# Patient Record
Sex: MALE | Race: WHITE | Employment: FULL TIME | URBAN - NONMETROPOLITAN AREA
[De-identification: names, ages, dates, MRNs, and addresses within clinical notes are randomized per-mention and may not be internally consistent; named-entity substitution may affect disease eponyms.]

---

## 2017-01-04 DIAGNOSIS — E78.00 HYPERCHOLESTEROLEMIA: ICD-10-CM

## 2017-01-04 DIAGNOSIS — I10 ESSENTIAL HYPERTENSION: ICD-10-CM

## 2017-01-04 LAB
ALBUMIN SERPL-MCNC: 4.7 G/DL (ref 3.9–4.9)
ALP BLD-CCNC: 65 U/L (ref 35–104)
ALT SERPL-CCNC: 23 U/L (ref 0–41)
ANION GAP SERPL CALCULATED.3IONS-SCNC: 13 MEQ/L (ref 7–13)
AST SERPL-CCNC: 17 U/L (ref 0–40)
BILIRUB SERPL-MCNC: 0.6 MG/DL (ref 0–1.2)
BUN BLDV-MCNC: 15 MG/DL (ref 6–20)
CALCIUM SERPL-MCNC: 9.2 MG/DL (ref 8.6–10.2)
CHLORIDE BLD-SCNC: 101 MEQ/L (ref 98–107)
CHOLESTEROL, TOTAL: 136 MG/DL (ref 0–199)
CO2: 26 MEQ/L (ref 22–29)
CREAT SERPL-MCNC: 0.85 MG/DL (ref 0.7–1.2)
GFR AFRICAN AMERICAN: >60
GFR NON-AFRICAN AMERICAN: >60
GLOBULIN: 2.4 G/DL (ref 2.3–3.5)
GLUCOSE BLD-MCNC: 98 MG/DL (ref 74–109)
HDLC SERPL-MCNC: 42 MG/DL (ref 40–59)
LDL CHOLESTEROL CALCULATED: 74 MG/DL (ref 0–129)
POTASSIUM SERPL-SCNC: 4.1 MEQ/L (ref 3.5–5.1)
SODIUM BLD-SCNC: 140 MEQ/L (ref 132–144)
TOTAL PROTEIN: 7.1 G/DL (ref 6.4–8.1)
TRIGL SERPL-MCNC: 102 MG/DL (ref 0–200)

## 2017-05-15 ENCOUNTER — OFFICE VISIT (OUTPATIENT)
Dept: FAMILY MEDICINE CLINIC | Age: 52
End: 2017-05-15

## 2017-05-15 VITALS
BODY MASS INDEX: 29.47 KG/M2 | SYSTOLIC BLOOD PRESSURE: 112 MMHG | HEIGHT: 76 IN | OXYGEN SATURATION: 98 % | DIASTOLIC BLOOD PRESSURE: 72 MMHG | HEART RATE: 72 BPM | WEIGHT: 242 LBS | TEMPERATURE: 95.5 F

## 2017-05-15 DIAGNOSIS — I10 ESSENTIAL HYPERTENSION: ICD-10-CM

## 2017-05-15 DIAGNOSIS — E78.00 HYPERCHOLESTEROLEMIA: ICD-10-CM

## 2017-05-15 DIAGNOSIS — Z12.5 SCREENING PSA (PROSTATE SPECIFIC ANTIGEN): ICD-10-CM

## 2017-05-15 DIAGNOSIS — I10 ESSENTIAL HYPERTENSION: Primary | ICD-10-CM

## 2017-05-15 LAB
ALBUMIN SERPL-MCNC: 4.4 G/DL (ref 3.9–4.9)
ALP BLD-CCNC: 61 U/L (ref 35–104)
ALT SERPL-CCNC: 23 U/L (ref 0–41)
ANION GAP SERPL CALCULATED.3IONS-SCNC: 12 MEQ/L (ref 7–13)
AST SERPL-CCNC: 17 U/L (ref 0–40)
BILIRUB SERPL-MCNC: 0.6 MG/DL (ref 0–1.2)
BUN BLDV-MCNC: 18 MG/DL (ref 6–20)
CALCIUM SERPL-MCNC: 9.4 MG/DL (ref 8.6–10.2)
CHLORIDE BLD-SCNC: 101 MEQ/L (ref 98–107)
CO2: 26 MEQ/L (ref 22–29)
CREAT SERPL-MCNC: 0.7 MG/DL (ref 0.7–1.2)
GFR AFRICAN AMERICAN: >60
GFR NON-AFRICAN AMERICAN: >60
GLOBULIN: 2.6 G/DL (ref 2.3–3.5)
GLUCOSE BLD-MCNC: 112 MG/DL (ref 74–109)
POTASSIUM SERPL-SCNC: 4.1 MEQ/L (ref 3.5–5.1)
PROSTATE SPECIFIC ANTIGEN: 1.14 NG/ML (ref 0–3.89)
SODIUM BLD-SCNC: 139 MEQ/L (ref 132–144)
TOTAL PROTEIN: 7 G/DL (ref 6.4–8.1)

## 2017-05-15 PROCEDURE — G8419 CALC BMI OUT NRM PARAM NOF/U: HCPCS | Performed by: NURSE PRACTITIONER

## 2017-05-15 PROCEDURE — G8427 DOCREV CUR MEDS BY ELIG CLIN: HCPCS | Performed by: NURSE PRACTITIONER

## 2017-05-15 PROCEDURE — 99213 OFFICE O/P EST LOW 20 MIN: CPT | Performed by: NURSE PRACTITIONER

## 2017-05-15 PROCEDURE — 1036F TOBACCO NON-USER: CPT | Performed by: NURSE PRACTITIONER

## 2017-05-15 RX ORDER — AMLODIPINE BESYLATE 5 MG/1
TABLET ORAL
Qty: 180 TABLET | Refills: 1 | Status: SHIPPED | OUTPATIENT
Start: 2017-05-15 | End: 2017-11-15 | Stop reason: SDUPTHER

## 2017-05-15 RX ORDER — ROSUVASTATIN CALCIUM 5 MG/1
TABLET, COATED ORAL
Qty: 90 TABLET | Refills: 1 | Status: SHIPPED | OUTPATIENT
Start: 2017-05-15 | End: 2017-11-15 | Stop reason: SDUPTHER

## 2017-05-15 RX ORDER — VALSARTAN AND HYDROCHLOROTHIAZIDE 320; 12.5 MG/1; MG/1
TABLET, FILM COATED ORAL
Qty: 90 TABLET | Refills: 1 | Status: SHIPPED | OUTPATIENT
Start: 2017-05-15 | End: 2017-11-15 | Stop reason: SDUPTHER

## 2017-05-15 ASSESSMENT — ENCOUNTER SYMPTOMS
SHORTNESS OF BREATH: 0
CONSTIPATION: 0
COUGH: 0
DIARRHEA: 0

## 2017-11-15 ENCOUNTER — OFFICE VISIT (OUTPATIENT)
Dept: FAMILY MEDICINE CLINIC | Age: 52
End: 2017-11-15

## 2017-11-15 VITALS
OXYGEN SATURATION: 98 % | WEIGHT: 237 LBS | HEIGHT: 76 IN | DIASTOLIC BLOOD PRESSURE: 60 MMHG | HEART RATE: 78 BPM | BODY MASS INDEX: 28.86 KG/M2 | SYSTOLIC BLOOD PRESSURE: 130 MMHG | TEMPERATURE: 96.5 F

## 2017-11-15 DIAGNOSIS — Z11.4 ENCOUNTER FOR SCREENING FOR HIV: ICD-10-CM

## 2017-11-15 DIAGNOSIS — Z23 NEED FOR INFLUENZA VACCINATION: ICD-10-CM

## 2017-11-15 DIAGNOSIS — I10 ESSENTIAL HYPERTENSION: Primary | ICD-10-CM

## 2017-11-15 DIAGNOSIS — E78.00 HYPERCHOLESTEROLEMIA: ICD-10-CM

## 2017-11-15 PROCEDURE — 99213 OFFICE O/P EST LOW 20 MIN: CPT | Performed by: NURSE PRACTITIONER

## 2017-11-15 PROCEDURE — G8427 DOCREV CUR MEDS BY ELIG CLIN: HCPCS | Performed by: NURSE PRACTITIONER

## 2017-11-15 PROCEDURE — 90471 IMMUNIZATION ADMIN: CPT | Performed by: NURSE PRACTITIONER

## 2017-11-15 PROCEDURE — G8484 FLU IMMUNIZE NO ADMIN: HCPCS | Performed by: NURSE PRACTITIONER

## 2017-11-15 PROCEDURE — 3017F COLORECTAL CA SCREEN DOC REV: CPT | Performed by: NURSE PRACTITIONER

## 2017-11-15 PROCEDURE — 90688 IIV4 VACCINE SPLT 0.5 ML IM: CPT | Performed by: NURSE PRACTITIONER

## 2017-11-15 PROCEDURE — 1036F TOBACCO NON-USER: CPT | Performed by: NURSE PRACTITIONER

## 2017-11-15 PROCEDURE — G8417 CALC BMI ABV UP PARAM F/U: HCPCS | Performed by: NURSE PRACTITIONER

## 2017-11-15 RX ORDER — ROSUVASTATIN CALCIUM 5 MG/1
TABLET, COATED ORAL
Qty: 90 TABLET | Refills: 1 | Status: SHIPPED | OUTPATIENT
Start: 2017-11-15 | End: 2018-05-15 | Stop reason: SDUPTHER

## 2017-11-15 RX ORDER — AMLODIPINE BESYLATE 5 MG/1
TABLET ORAL
Qty: 180 TABLET | Refills: 1 | Status: SHIPPED | OUTPATIENT
Start: 2017-11-15 | End: 2018-05-15 | Stop reason: SDUPTHER

## 2017-11-15 RX ORDER — VALSARTAN AND HYDROCHLOROTHIAZIDE 320; 12.5 MG/1; MG/1
TABLET, FILM COATED ORAL
Qty: 90 TABLET | Refills: 1 | Status: SHIPPED | OUTPATIENT
Start: 2017-11-15 | End: 2018-05-15 | Stop reason: SDUPTHER

## 2017-11-15 ASSESSMENT — PATIENT HEALTH QUESTIONNAIRE - PHQ9
SUM OF ALL RESPONSES TO PHQ9 QUESTIONS 1 & 2: 0
1. LITTLE INTEREST OR PLEASURE IN DOING THINGS: 0
SUM OF ALL RESPONSES TO PHQ QUESTIONS 1-9: 0
2. FEELING DOWN, DEPRESSED OR HOPELESS: 0

## 2017-11-15 ASSESSMENT — ENCOUNTER SYMPTOMS
COUGH: 0
SHORTNESS OF BREATH: 0

## 2017-11-15 NOTE — PROGRESS NOTES
Vaccine Information Sheet, \"Influenza - Inactivated\"  given to John Valdez, or parent/legal guardian of  John Valdez and verbalized understanding. Patient responses:    Have you ever had a reaction to a flu vaccine? No  Are you able to eat eggs without adverse effects? Yes  Do you have any current illness? No  Have you ever had Guillian Reinbeck Syndrome? No    Flu vaccine given per order. Please see immunization tab. After obtaining consent, and per orders of Dr. Analy Barrios injection of flu given in Left deltoid by Cayla Collier. Patient instructed to remain in clinic for 20 minutes afterwards, and to report any adverse reaction to me immediately.
Expiration Date:   11/15/2018     Order Specific Question:   Is Patient Fasting?/# of Hours     Answer:   12    Comprehensive Metabolic Panel     Standing Status:   Future     Standing Expiration Date:   11/15/2018    Hiv-1,-2 W/Reflex To Hiv-1 Western Blot     Standing Status:   Future     Standing Expiration Date:   11/15/2018       Orders Placed This Encounter   Medications    valsartan-hydrochlorothiazide (DIOVAN-HCT) 320-12.5 MG per tablet     Sig: TAKE 1 TABLET DAILY     Dispense:  90 tablet     Refill:  1    rosuvastatin (CRESTOR) 5 MG tablet     Sig: TAKE 1 TABLET DAILY     Dispense:  90 tablet     Refill:  1    amLODIPine (NORVASC) 5 MG tablet     Sig: TAKE 1 TABLET TWICE A DAY     Dispense:  180 tablet     Refill:  1       Side effects, adverse effects of the medication prescribed today, as well as treatment plan/ rationale and result expectations have been discussed with the patient who expresses understanding and desires to proceed. Close follow up to evaluate treatment results and for coordination of care. I have reviewed the patient's medical history in detail and updated the computerized patient record. As always, patient is advised that if symptoms worsen in any way they will proceed to the nearest emergency room. Fu prn or in 6 mos.   Michael Dawson, NP

## 2017-12-04 DIAGNOSIS — I10 ESSENTIAL HYPERTENSION: ICD-10-CM

## 2017-12-04 DIAGNOSIS — Z11.4 ENCOUNTER FOR SCREENING FOR HIV: ICD-10-CM

## 2017-12-04 DIAGNOSIS — E78.00 HYPERCHOLESTEROLEMIA: ICD-10-CM

## 2017-12-04 LAB
ALBUMIN SERPL-MCNC: 4.7 G/DL (ref 3.9–4.9)
ALP BLD-CCNC: 64 U/L (ref 35–104)
ALT SERPL-CCNC: 21 U/L (ref 0–41)
ANION GAP SERPL CALCULATED.3IONS-SCNC: 15 MEQ/L (ref 7–13)
AST SERPL-CCNC: 16 U/L (ref 0–40)
BILIRUB SERPL-MCNC: 0.5 MG/DL (ref 0–1.2)
BUN BLDV-MCNC: 17 MG/DL (ref 6–20)
CALCIUM SERPL-MCNC: 9.2 MG/DL (ref 8.6–10.2)
CHLORIDE BLD-SCNC: 99 MEQ/L (ref 98–107)
CHOLESTEROL, TOTAL: 155 MG/DL (ref 0–199)
CO2: 26 MEQ/L (ref 22–29)
CREAT SERPL-MCNC: 0.84 MG/DL (ref 0.7–1.2)
GFR AFRICAN AMERICAN: >60
GFR NON-AFRICAN AMERICAN: >60
GLOBULIN: 2.4 G/DL (ref 2.3–3.5)
GLUCOSE BLD-MCNC: 99 MG/DL (ref 74–109)
HDLC SERPL-MCNC: 53 MG/DL (ref 40–59)
LDL CHOLESTEROL CALCULATED: 84 MG/DL (ref 0–129)
POTASSIUM SERPL-SCNC: 4.1 MEQ/L (ref 3.5–5.1)
SODIUM BLD-SCNC: 140 MEQ/L (ref 132–144)
TOTAL PROTEIN: 7.1 G/DL (ref 6.4–8.1)
TRIGL SERPL-MCNC: 92 MG/DL (ref 0–200)

## 2017-12-06 LAB — HIV-1 AND HIV-2 ANTIBODIES: NEGATIVE

## 2018-05-15 ENCOUNTER — OFFICE VISIT (OUTPATIENT)
Dept: FAMILY MEDICINE CLINIC | Age: 53
End: 2018-05-15
Payer: COMMERCIAL

## 2018-05-15 VITALS
BODY MASS INDEX: 27.89 KG/M2 | HEIGHT: 76 IN | OXYGEN SATURATION: 98 % | TEMPERATURE: 96.1 F | DIASTOLIC BLOOD PRESSURE: 62 MMHG | HEART RATE: 62 BPM | SYSTOLIC BLOOD PRESSURE: 124 MMHG | WEIGHT: 229 LBS

## 2018-05-15 DIAGNOSIS — E78.00 HYPERCHOLESTEROLEMIA: Primary | ICD-10-CM

## 2018-05-15 DIAGNOSIS — Z13.1 DIABETES MELLITUS SCREENING: ICD-10-CM

## 2018-05-15 DIAGNOSIS — I10 ESSENTIAL HYPERTENSION: ICD-10-CM

## 2018-05-15 PROCEDURE — 99213 OFFICE O/P EST LOW 20 MIN: CPT | Performed by: NURSE PRACTITIONER

## 2018-05-15 RX ORDER — ROSUVASTATIN CALCIUM 5 MG/1
TABLET, COATED ORAL
Qty: 90 TABLET | Refills: 1 | Status: SHIPPED | OUTPATIENT
Start: 2018-05-15 | End: 2018-07-09 | Stop reason: SDUPTHER

## 2018-05-15 RX ORDER — AMLODIPINE BESYLATE 5 MG/1
TABLET ORAL
Qty: 180 TABLET | Refills: 1 | Status: SHIPPED | OUTPATIENT
Start: 2018-05-15 | End: 2018-07-09 | Stop reason: SDUPTHER

## 2018-05-15 RX ORDER — VALSARTAN AND HYDROCHLOROTHIAZIDE 320; 12.5 MG/1; MG/1
TABLET, FILM COATED ORAL
Qty: 90 TABLET | Refills: 1 | Status: SHIPPED | OUTPATIENT
Start: 2018-05-15 | End: 2018-07-09 | Stop reason: SDUPTHER

## 2018-05-15 ASSESSMENT — ENCOUNTER SYMPTOMS
COUGH: 0
SHORTNESS OF BREATH: 0

## 2018-06-22 DIAGNOSIS — Z13.1 DIABETES MELLITUS SCREENING: ICD-10-CM

## 2018-06-22 LAB
ALBUMIN SERPL-MCNC: 4.5 G/DL (ref 3.9–4.9)
ALP BLD-CCNC: 66 U/L (ref 35–104)
ALT SERPL-CCNC: 17 U/L (ref 0–41)
ANION GAP SERPL CALCULATED.3IONS-SCNC: 14 MEQ/L (ref 7–13)
AST SERPL-CCNC: 13 U/L (ref 0–40)
BILIRUB SERPL-MCNC: 0.6 MG/DL (ref 0–1.2)
BILIRUBIN DIRECT: <0.2 MG/DL (ref 0–0.3)
BILIRUBIN, INDIRECT: NORMAL MG/DL (ref 0–0.6)
BUN BLDV-MCNC: 16 MG/DL (ref 6–20)
CALCIUM SERPL-MCNC: 9.4 MG/DL (ref 8.6–10.2)
CHLORIDE BLD-SCNC: 102 MEQ/L (ref 98–107)
CHOLESTEROL, TOTAL: 143 MG/DL (ref 0–199)
CO2: 25 MEQ/L (ref 22–29)
CREAT SERPL-MCNC: 0.84 MG/DL (ref 0.7–1.2)
GFR AFRICAN AMERICAN: >60
GFR NON-AFRICAN AMERICAN: >60
GLUCOSE BLD-MCNC: 86 MG/DL (ref 74–109)
HBA1C MFR BLD: 5.6 % (ref 4.8–5.9)
HDLC SERPL-MCNC: 48 MG/DL (ref 40–59)
LDL CHOLESTEROL CALCULATED: 82 MG/DL (ref 0–129)
POTASSIUM SERPL-SCNC: 3.7 MEQ/L (ref 3.5–5.1)
SODIUM BLD-SCNC: 141 MEQ/L (ref 132–144)
TOTAL PROTEIN: 7.3 G/DL (ref 6.4–8.1)
TRIGL SERPL-MCNC: 67 MG/DL (ref 0–200)

## 2018-07-09 DIAGNOSIS — I10 ESSENTIAL HYPERTENSION: ICD-10-CM

## 2018-07-09 DIAGNOSIS — E78.00 HYPERCHOLESTEROLEMIA: ICD-10-CM

## 2018-07-09 RX ORDER — ROSUVASTATIN CALCIUM 5 MG/1
TABLET, COATED ORAL
Qty: 90 TABLET | Refills: 1 | Status: SHIPPED | OUTPATIENT
Start: 2018-07-09 | End: 2018-10-23 | Stop reason: SDUPTHER

## 2018-07-09 RX ORDER — AMLODIPINE BESYLATE 5 MG/1
TABLET ORAL
Qty: 180 TABLET | Refills: 1 | Status: SHIPPED | OUTPATIENT
Start: 2018-07-09 | End: 2018-10-23 | Stop reason: SDUPTHER

## 2018-07-09 RX ORDER — VALSARTAN AND HYDROCHLOROTHIAZIDE 320; 12.5 MG/1; MG/1
TABLET, FILM COATED ORAL
Qty: 90 TABLET | Refills: 1 | Status: SHIPPED | OUTPATIENT
Start: 2018-07-09 | End: 2018-11-15 | Stop reason: SINTOL

## 2018-08-02 DIAGNOSIS — I10 ESSENTIAL HYPERTENSION: Primary | ICD-10-CM

## 2018-08-06 RX ORDER — OLMESARTAN MEDOXOMIL AND HYDROCHLOROTHIAZIDE 40/12.5 40; 12.5 MG/1; MG/1
1 TABLET ORAL DAILY
Qty: 90 TABLET | Refills: 1 | Status: SHIPPED | OUTPATIENT
Start: 2018-08-06 | End: 2018-10-23 | Stop reason: SDUPTHER

## 2018-08-06 RX ORDER — OLMESARTAN MEDOXOMIL AND HYDROCHLOROTHIAZIDE 40/12.5 40; 12.5 MG/1; MG/1
1 TABLET ORAL DAILY
Qty: 30 TABLET | Refills: 3 | Status: SHIPPED | OUTPATIENT
Start: 2018-08-06 | End: 2018-08-06 | Stop reason: SDUPTHER

## 2018-10-23 DIAGNOSIS — I10 ESSENTIAL HYPERTENSION: ICD-10-CM

## 2018-10-23 DIAGNOSIS — E78.00 HYPERCHOLESTEROLEMIA: ICD-10-CM

## 2018-10-23 RX ORDER — ROSUVASTATIN CALCIUM 5 MG/1
TABLET, COATED ORAL
Qty: 90 TABLET | Refills: 1 | Status: SHIPPED | OUTPATIENT
Start: 2018-10-23 | End: 2019-05-06 | Stop reason: SDUPTHER

## 2018-10-23 RX ORDER — AMLODIPINE BESYLATE 5 MG/1
TABLET ORAL
Qty: 180 TABLET | Refills: 1 | Status: SHIPPED | OUTPATIENT
Start: 2018-10-23 | End: 2019-05-06 | Stop reason: SDUPTHER

## 2018-10-23 RX ORDER — OLMESARTAN MEDOXOMIL AND HYDROCHLOROTHIAZIDE 40/12.5 40; 12.5 MG/1; MG/1
1 TABLET ORAL DAILY
Qty: 90 TABLET | Refills: 1 | Status: SHIPPED | OUTPATIENT
Start: 2018-10-23 | End: 2019-05-06 | Stop reason: SDUPTHER

## 2018-10-23 NOTE — TELEPHONE ENCOUNTER
From: Daryle Heidelberg  Sent: 10/23/2018 9:10 AM EDT  Subject: Medication Renewal Request    Tai Banegas would like a refill of the following medications:     rosuvastatin (CRESTOR) 5 MG tablet [facility] APRGAVIN - CNP]     amLODIPine (NORVASC) 5 MG tablet [facility] APRGAVIN - CNP]     olmesartan-hydrochlorothiazide (BENICAR HCT) 40-12.5 MG per tablet [facility] APRGAVIN - CNP]    Preferred pharmacy: [facility], [address] [place] [address] [phone] [facility] [phone]

## 2018-11-15 ENCOUNTER — OFFICE VISIT (OUTPATIENT)
Dept: FAMILY MEDICINE CLINIC | Age: 53
End: 2018-11-15
Payer: COMMERCIAL

## 2018-11-15 VITALS
SYSTOLIC BLOOD PRESSURE: 130 MMHG | HEART RATE: 75 BPM | DIASTOLIC BLOOD PRESSURE: 68 MMHG | OXYGEN SATURATION: 99 % | HEIGHT: 76 IN | TEMPERATURE: 96.6 F | WEIGHT: 222.8 LBS | BODY MASS INDEX: 27.13 KG/M2

## 2018-11-15 DIAGNOSIS — I10 ESSENTIAL HYPERTENSION: Primary | ICD-10-CM

## 2018-11-15 DIAGNOSIS — E78.00 HYPERCHOLESTEROLEMIA: ICD-10-CM

## 2018-11-15 DIAGNOSIS — Z23 NEED FOR INFLUENZA VACCINATION: ICD-10-CM

## 2018-11-15 DIAGNOSIS — K90.0 CELIAC DISEASE: ICD-10-CM

## 2018-11-15 PROCEDURE — 3017F COLORECTAL CA SCREEN DOC REV: CPT | Performed by: NURSE PRACTITIONER

## 2018-11-15 PROCEDURE — 1036F TOBACCO NON-USER: CPT | Performed by: NURSE PRACTITIONER

## 2018-11-15 PROCEDURE — G8482 FLU IMMUNIZE ORDER/ADMIN: HCPCS | Performed by: NURSE PRACTITIONER

## 2018-11-15 PROCEDURE — G8427 DOCREV CUR MEDS BY ELIG CLIN: HCPCS | Performed by: NURSE PRACTITIONER

## 2018-11-15 PROCEDURE — 99214 OFFICE O/P EST MOD 30 MIN: CPT | Performed by: NURSE PRACTITIONER

## 2018-11-15 PROCEDURE — 90688 IIV4 VACCINE SPLT 0.5 ML IM: CPT | Performed by: NURSE PRACTITIONER

## 2018-11-15 PROCEDURE — G8417 CALC BMI ABV UP PARAM F/U: HCPCS | Performed by: NURSE PRACTITIONER

## 2018-11-15 PROCEDURE — 90471 IMMUNIZATION ADMIN: CPT | Performed by: NURSE PRACTITIONER

## 2018-11-15 ASSESSMENT — PATIENT HEALTH QUESTIONNAIRE - PHQ9
SUM OF ALL RESPONSES TO PHQ9 QUESTIONS 1 & 2: 0
2. FEELING DOWN, DEPRESSED OR HOPELESS: 0
1. LITTLE INTEREST OR PLEASURE IN DOING THINGS: 0
SUM OF ALL RESPONSES TO PHQ QUESTIONS 1-9: 0
SUM OF ALL RESPONSES TO PHQ QUESTIONS 1-9: 0

## 2018-11-15 ASSESSMENT — ENCOUNTER SYMPTOMS
DIARRHEA: 0
SHORTNESS OF BREATH: 0
CONSTIPATION: 0
COUGH: 0

## 2018-11-15 NOTE — PROGRESS NOTES
Vaccine Information Sheet, \"Influenza - Inactivated\"  given to Charli Richardson, or parent/legal guardian of  Charli Richardson and verbalized understanding. Patient responses:    Have you ever had a reaction to a flu vaccine? No  Are you able to eat eggs without adverse effects? Yes  Do you have any current illness? No  Have you ever had Guillian Holt Syndrome? No    Flu vaccine given per order. Please see immunization tab. After obtaining consent, and per orders of eileen luevano cnp, injection of flu shot given in Left deltoid by Yvonne Zamorano. Patient instructed to remain in clinic for 20 minutes afterwards, and to report any adverse reaction to me immediately. Pt tolerated well.

## 2019-05-06 DIAGNOSIS — E78.00 HYPERCHOLESTEROLEMIA: ICD-10-CM

## 2019-05-06 DIAGNOSIS — I10 ESSENTIAL HYPERTENSION: ICD-10-CM

## 2019-05-06 RX ORDER — ROSUVASTATIN CALCIUM 5 MG/1
TABLET, COATED ORAL
Qty: 90 TABLET | Refills: 1 | Status: SHIPPED | OUTPATIENT
Start: 2019-05-06 | End: 2019-11-05 | Stop reason: SDUPTHER

## 2019-05-06 RX ORDER — OLMESARTAN MEDOXOMIL AND HYDROCHLOROTHIAZIDE 40/12.5 40; 12.5 MG/1; MG/1
1 TABLET ORAL DAILY
Qty: 90 TABLET | Refills: 1 | Status: SHIPPED | OUTPATIENT
Start: 2019-05-06 | End: 2019-08-12

## 2019-05-06 RX ORDER — AMLODIPINE BESYLATE 5 MG/1
TABLET ORAL
Qty: 180 TABLET | Refills: 1 | Status: SHIPPED | OUTPATIENT
Start: 2019-05-06 | End: 2019-11-05 | Stop reason: SDUPTHER

## 2019-08-11 DIAGNOSIS — I10 ESSENTIAL HYPERTENSION: ICD-10-CM

## 2019-08-12 RX ORDER — OLMESARTAN MEDOXOMIL AND HYDROCHLOROTHIAZIDE 40/12.5 40; 12.5 MG/1; MG/1
TABLET ORAL
Qty: 90 TABLET | Refills: 0 | Status: SHIPPED | OUTPATIENT
Start: 2019-08-12 | End: 2019-11-05 | Stop reason: SDUPTHER

## 2019-11-05 ENCOUNTER — OFFICE VISIT (OUTPATIENT)
Dept: FAMILY MEDICINE CLINIC | Age: 54
End: 2019-11-05
Payer: COMMERCIAL

## 2019-11-05 VITALS
HEART RATE: 66 BPM | SYSTOLIC BLOOD PRESSURE: 110 MMHG | OXYGEN SATURATION: 96 % | DIASTOLIC BLOOD PRESSURE: 60 MMHG | TEMPERATURE: 98.8 F | WEIGHT: 230.6 LBS | BODY MASS INDEX: 28.08 KG/M2 | HEIGHT: 76 IN | RESPIRATION RATE: 12 BRPM

## 2019-11-05 DIAGNOSIS — R68.89 COLD INTOLERANCE: ICD-10-CM

## 2019-11-05 DIAGNOSIS — I10 ESSENTIAL HYPERTENSION: Primary | ICD-10-CM

## 2019-11-05 DIAGNOSIS — I10 ESSENTIAL HYPERTENSION: ICD-10-CM

## 2019-11-05 DIAGNOSIS — E78.00 HYPERCHOLESTEROLEMIA: ICD-10-CM

## 2019-11-05 DIAGNOSIS — Z23 NEED FOR INFLUENZA VACCINATION: ICD-10-CM

## 2019-11-05 LAB
ALBUMIN SERPL-MCNC: 4.7 G/DL (ref 3.5–4.6)
ALP BLD-CCNC: 65 U/L (ref 35–104)
ALT SERPL-CCNC: 18 U/L (ref 0–41)
ANION GAP SERPL CALCULATED.3IONS-SCNC: 12 MEQ/L (ref 9–15)
AST SERPL-CCNC: 17 U/L (ref 0–40)
BASOPHILS ABSOLUTE: 0.1 K/UL (ref 0–0.2)
BASOPHILS RELATIVE PERCENT: 1 %
BILIRUB SERPL-MCNC: 0.6 MG/DL (ref 0.2–0.7)
BUN BLDV-MCNC: 20 MG/DL (ref 6–20)
CALCIUM SERPL-MCNC: 9.2 MG/DL (ref 8.5–9.9)
CHLORIDE BLD-SCNC: 101 MEQ/L (ref 95–107)
CHOLESTEROL, TOTAL: 169 MG/DL (ref 0–199)
CO2: 27 MEQ/L (ref 20–31)
CREAT SERPL-MCNC: 0.86 MG/DL (ref 0.7–1.2)
EOSINOPHILS ABSOLUTE: 0.4 K/UL (ref 0–0.7)
EOSINOPHILS RELATIVE PERCENT: 6.9 %
GFR AFRICAN AMERICAN: >60
GFR NON-AFRICAN AMERICAN: >60
GLOBULIN: 2.5 G/DL (ref 2.3–3.5)
GLUCOSE BLD-MCNC: 71 MG/DL (ref 70–99)
HCT VFR BLD CALC: 43.3 % (ref 42–52)
HDLC SERPL-MCNC: 49 MG/DL (ref 40–59)
HEMOGLOBIN: 14.8 G/DL (ref 14–18)
LDL CHOLESTEROL CALCULATED: 97 MG/DL (ref 0–129)
LYMPHOCYTES ABSOLUTE: 1.8 K/UL (ref 1–4.8)
LYMPHOCYTES RELATIVE PERCENT: 33.5 %
MCH RBC QN AUTO: 31.2 PG (ref 27–31.3)
MCHC RBC AUTO-ENTMCNC: 34.1 % (ref 33–37)
MCV RBC AUTO: 91.6 FL (ref 80–100)
MONOCYTES ABSOLUTE: 0.5 K/UL (ref 0.2–0.8)
MONOCYTES RELATIVE PERCENT: 9.9 %
NEUTROPHILS ABSOLUTE: 2.7 K/UL (ref 1.4–6.5)
NEUTROPHILS RELATIVE PERCENT: 48.7 %
PDW BLD-RTO: 13.2 % (ref 11.5–14.5)
PLATELET # BLD: 277 K/UL (ref 130–400)
POTASSIUM SERPL-SCNC: 3.9 MEQ/L (ref 3.4–4.9)
RBC # BLD: 4.73 M/UL (ref 4.7–6.1)
SODIUM BLD-SCNC: 140 MEQ/L (ref 135–144)
TOTAL PROTEIN: 7.2 G/DL (ref 6.3–8)
TRIGL SERPL-MCNC: 115 MG/DL (ref 0–150)
TSH REFLEX: 1.48 UIU/ML (ref 0.44–3.86)
WBC # BLD: 5.5 K/UL (ref 4.8–10.8)

## 2019-11-05 PROCEDURE — 90471 IMMUNIZATION ADMIN: CPT | Performed by: NURSE PRACTITIONER

## 2019-11-05 PROCEDURE — 90686 IIV4 VACC NO PRSV 0.5 ML IM: CPT | Performed by: NURSE PRACTITIONER

## 2019-11-05 PROCEDURE — G8482 FLU IMMUNIZE ORDER/ADMIN: HCPCS | Performed by: NURSE PRACTITIONER

## 2019-11-05 PROCEDURE — 3017F COLORECTAL CA SCREEN DOC REV: CPT | Performed by: NURSE PRACTITIONER

## 2019-11-05 PROCEDURE — G8417 CALC BMI ABV UP PARAM F/U: HCPCS | Performed by: NURSE PRACTITIONER

## 2019-11-05 PROCEDURE — 99214 OFFICE O/P EST MOD 30 MIN: CPT | Performed by: NURSE PRACTITIONER

## 2019-11-05 PROCEDURE — 1036F TOBACCO NON-USER: CPT | Performed by: NURSE PRACTITIONER

## 2019-11-05 PROCEDURE — G8427 DOCREV CUR MEDS BY ELIG CLIN: HCPCS | Performed by: NURSE PRACTITIONER

## 2019-11-05 RX ORDER — OLMESARTAN MEDOXOMIL AND HYDROCHLOROTHIAZIDE 40/12.5 40; 12.5 MG/1; MG/1
TABLET ORAL
Qty: 90 TABLET | Refills: 1 | Status: SHIPPED | OUTPATIENT
Start: 2019-11-05 | End: 2020-04-01 | Stop reason: SDUPTHER

## 2019-11-05 RX ORDER — ROSUVASTATIN CALCIUM 5 MG/1
TABLET, COATED ORAL
Qty: 90 TABLET | Refills: 1 | Status: SHIPPED | OUTPATIENT
Start: 2019-11-05 | End: 2020-04-01 | Stop reason: SDUPTHER

## 2019-11-05 RX ORDER — AMLODIPINE BESYLATE 5 MG/1
TABLET ORAL
Qty: 180 TABLET | Refills: 1 | Status: SHIPPED | OUTPATIENT
Start: 2019-11-05 | End: 2020-04-01 | Stop reason: SDUPTHER

## 2019-11-05 ASSESSMENT — ENCOUNTER SYMPTOMS
DIARRHEA: 0
CONSTIPATION: 0
BLURRED VISION: 0
SHORTNESS OF BREATH: 0
COUGH: 0

## 2019-11-05 ASSESSMENT — PATIENT HEALTH QUESTIONNAIRE - PHQ9
SUM OF ALL RESPONSES TO PHQ9 QUESTIONS 1 & 2: 0
2. FEELING DOWN, DEPRESSED OR HOPELESS: 0
SUM OF ALL RESPONSES TO PHQ QUESTIONS 1-9: 0
SUM OF ALL RESPONSES TO PHQ QUESTIONS 1-9: 0
1. LITTLE INTEREST OR PLEASURE IN DOING THINGS: 0

## 2020-04-01 RX ORDER — OLMESARTAN MEDOXOMIL AND HYDROCHLOROTHIAZIDE 40/12.5 40; 12.5 MG/1; MG/1
TABLET ORAL
Qty: 90 TABLET | Refills: 1 | Status: SHIPPED | OUTPATIENT
Start: 2020-04-01 | End: 2020-11-09 | Stop reason: SDUPTHER

## 2020-04-01 RX ORDER — AMLODIPINE BESYLATE 5 MG/1
TABLET ORAL
Qty: 180 TABLET | Refills: 1 | Status: SHIPPED | OUTPATIENT
Start: 2020-04-01 | End: 2020-11-22 | Stop reason: ALTCHOICE

## 2020-04-01 RX ORDER — ROSUVASTATIN CALCIUM 5 MG/1
TABLET, COATED ORAL
Qty: 90 TABLET | Refills: 1 | Status: SHIPPED | OUTPATIENT
Start: 2020-04-01 | End: 2020-11-09 | Stop reason: SDUPTHER

## 2020-05-05 ENCOUNTER — VIRTUAL VISIT (OUTPATIENT)
Dept: FAMILY MEDICINE CLINIC | Age: 55
End: 2020-05-05
Payer: COMMERCIAL

## 2020-05-05 VITALS — HEIGHT: 76 IN | WEIGHT: 230 LBS | BODY MASS INDEX: 28.01 KG/M2

## 2020-05-05 PROCEDURE — G8427 DOCREV CUR MEDS BY ELIG CLIN: HCPCS | Performed by: NURSE PRACTITIONER

## 2020-05-05 PROCEDURE — 99213 OFFICE O/P EST LOW 20 MIN: CPT | Performed by: NURSE PRACTITIONER

## 2020-05-05 PROCEDURE — 3017F COLORECTAL CA SCREEN DOC REV: CPT | Performed by: NURSE PRACTITIONER

## 2020-05-05 ASSESSMENT — ENCOUNTER SYMPTOMS
COUGH: 0
SHORTNESS OF BREATH: 0

## 2020-05-05 NOTE — PROGRESS NOTES
2020    TELEHEALTH EVALUATION -- Audio/Visual (During DJBYZ-85 public health emergency)    Due to COVID 19 outbreak, patient's office visit was converted to a virtual visit. Patient was contacted and agreed to proceed with a virtual visit via [employer]. me  The risks and benefits of converting to a virtual visit were discussed in light of the current infectious disease epidemic. Patient also understood that insurance coverage and co-pays are up to their individual insurance plans. HPI:    Micheal Lynner (:  1965) has requested an audio/video evaluation for the following concern(s):    Pt is follow up for chronic health issues. HTN- has been monitoring on own. 118/70's is where avg is. Hyperlipidemia- trying to watch diet. Trying to walk regularly. Stomach has been doing well. Review of Systems   Constitutional: Negative for fatigue. Respiratory: Negative for cough and shortness of breath. Cardiovascular: Negative for chest pain. Prior to Visit Medications    Medication Sig Taking? Authorizing Provider   olmesartan-hydrochlorothiazide (BENICAR HCT) 40-12.5 MG per tablet Take 1 tablet by mouth every day Yes ALLA Hurt Junior, CNP   rosuvastatin (CRESTOR) 5 MG tablet TAKE 1 TABLET DAILY Yes ALLA Hurt Junior, CNP   amLODIPine (NORVASC) 5 MG tablet TAKE 1 TABLET TWICE A DAY Yes ALLA Hurt Junior, CNP       Social History     Tobacco Use    Smoking status: Never Smoker    Smokeless tobacco: Never Used   Substance Use Topics    Alcohol use: No    Drug use: No        Allergies   Allergen Reactions    Buspar [Buspirone Hcl] Other (See Comments)     Chills and decreased appetite.     Lopressor [Metoprolol]      Light headed   ,   Past Medical History:   Diagnosis Date    Celiac disease     Hypercholesterolemia     Hypertension    ,   Past Surgical History:   Procedure Laterality Date    COLONOSCOPY  11    normal    HERNIA REPAIR Left 5/8/15    repair of ventral hernia with mesh and left inguinal hernia repair Dr Victorino Hopson     ,   Social History     Tobacco Use    Smoking status: Never Smoker    Smokeless tobacco: Never Used   Substance Use Topics    Alcohol use: No    Drug use: No   ,   Family History   Problem Relation Age of Onset    High Blood Pressure Sister     High Blood Pressure Mother        PHYSICAL EXAMINATION:  [ INSTRUCTIONS:  \"[x]\" Indicates a positive item  \"[]\" Indicates a negative item  -- DELETE ALL ITEMS NOT EXAMINED]  [x] Alert  [x] Oriented to person/place/time    [x] No apparent distress  [] Toxic appearing    [] Face flushed appearing [x] Sclera clear  [] Lips are cyanotic      [x] Breathing appears normal  [] Appears tachypneic      [] Rash on visible skin    [x] Cranial Nerves II-XII grossly intact    [x] Motor grossly intact in visible upper extremities    [x] Motor grossly intact in visible lower extremities    [x] Normal Mood  [] Anxious appearing    [] Depressed appearing  [] Confused appearing      [] Poor short term memory  [] Poor long term memory    [] OTHER:      Due to this being a TeleHealth encounter, evaluation of the following organ systems is limited: Vitals/Constitutional/EENT/Resp/CV/GI//MS/Neuro/Skin/Heme-Lymph-Imm. ASSESSMENT/PLAN:  1. Essential hypertension      2. Hypercholesterolemia    Side effects, adverse effects of the medication prescribed today, as well as treatment plan/ rationale and result expectations have been discussed with the patient who expresses understanding and desires to proceed. Close follow up to evaluate treatment results and for coordination of care. I have reviewed the patient's medical history in detail and updated the computerized patient record. As always, patient is advised that if symptoms worsen in any way they will proceed to the nearest emergency room. We will get labs during next appt.     Return in about 6 months (around

## 2020-07-17 ENCOUNTER — OFFICE VISIT (OUTPATIENT)
Dept: SURGERY | Age: 55
End: 2020-07-17
Payer: COMMERCIAL

## 2020-07-17 ENCOUNTER — OFFICE VISIT (OUTPATIENT)
Dept: FAMILY MEDICINE CLINIC | Age: 55
End: 2020-07-17
Payer: COMMERCIAL

## 2020-07-17 VITALS
TEMPERATURE: 97.4 F | WEIGHT: 213.4 LBS | HEART RATE: 83 BPM | OXYGEN SATURATION: 99 % | SYSTOLIC BLOOD PRESSURE: 112 MMHG | BODY MASS INDEX: 25.99 KG/M2 | DIASTOLIC BLOOD PRESSURE: 66 MMHG | HEIGHT: 76 IN

## 2020-07-17 VITALS
HEIGHT: 76 IN | SYSTOLIC BLOOD PRESSURE: 114 MMHG | BODY MASS INDEX: 25.57 KG/M2 | TEMPERATURE: 97.9 F | WEIGHT: 210 LBS | DIASTOLIC BLOOD PRESSURE: 78 MMHG

## 2020-07-17 DIAGNOSIS — L02.212 BACK ABSCESS: ICD-10-CM

## 2020-07-17 PROCEDURE — G8417 CALC BMI ABV UP PARAM F/U: HCPCS | Performed by: SURGERY

## 2020-07-17 PROCEDURE — 99213 OFFICE O/P EST LOW 20 MIN: CPT | Performed by: NURSE PRACTITIONER

## 2020-07-17 PROCEDURE — 1036F TOBACCO NON-USER: CPT | Performed by: NURSE PRACTITIONER

## 2020-07-17 PROCEDURE — 3017F COLORECTAL CA SCREEN DOC REV: CPT | Performed by: SURGERY

## 2020-07-17 PROCEDURE — 1036F TOBACCO NON-USER: CPT | Performed by: SURGERY

## 2020-07-17 PROCEDURE — 3017F COLORECTAL CA SCREEN DOC REV: CPT | Performed by: NURSE PRACTITIONER

## 2020-07-17 PROCEDURE — 99202 OFFICE O/P NEW SF 15 MIN: CPT | Performed by: SURGERY

## 2020-07-17 PROCEDURE — 10061 I&D ABSCESS COMP/MULTIPLE: CPT | Performed by: SURGERY

## 2020-07-17 PROCEDURE — G8427 DOCREV CUR MEDS BY ELIG CLIN: HCPCS | Performed by: NURSE PRACTITIONER

## 2020-07-17 PROCEDURE — G8417 CALC BMI ABV UP PARAM F/U: HCPCS | Performed by: NURSE PRACTITIONER

## 2020-07-17 PROCEDURE — G8427 DOCREV CUR MEDS BY ELIG CLIN: HCPCS | Performed by: SURGERY

## 2020-07-17 RX ORDER — SULFAMETHOXAZOLE AND TRIMETHOPRIM 800; 160 MG/1; MG/1
1 TABLET ORAL 2 TIMES DAILY
Qty: 28 TABLET | Refills: 1 | Status: SHIPPED | OUTPATIENT
Start: 2020-07-17 | End: 2020-07-31

## 2020-07-17 SDOH — ECONOMIC STABILITY: TRANSPORTATION INSECURITY
IN THE PAST 12 MONTHS, HAS LACK OF TRANSPORTATION KEPT YOU FROM MEETINGS, WORK, OR FROM GETTING THINGS NEEDED FOR DAILY LIVING?: NO

## 2020-07-17 SDOH — ECONOMIC STABILITY: FOOD INSECURITY: WITHIN THE PAST 12 MONTHS, THE FOOD YOU BOUGHT JUST DIDN'T LAST AND YOU DIDN'T HAVE MONEY TO GET MORE.: NEVER TRUE

## 2020-07-17 SDOH — ECONOMIC STABILITY: TRANSPORTATION INSECURITY
IN THE PAST 12 MONTHS, HAS THE LACK OF TRANSPORTATION KEPT YOU FROM MEDICAL APPOINTMENTS OR FROM GETTING MEDICATIONS?: NO

## 2020-07-17 SDOH — ECONOMIC STABILITY: INCOME INSECURITY: HOW HARD IS IT FOR YOU TO PAY FOR THE VERY BASICS LIKE FOOD, HOUSING, MEDICAL CARE, AND HEATING?: NOT HARD AT ALL

## 2020-07-17 SDOH — ECONOMIC STABILITY: FOOD INSECURITY: WITHIN THE PAST 12 MONTHS, YOU WORRIED THAT YOUR FOOD WOULD RUN OUT BEFORE YOU GOT MONEY TO BUY MORE.: NEVER TRUE

## 2020-07-17 ASSESSMENT — ENCOUNTER SYMPTOMS
EYES NEGATIVE: 1
BACK PAIN: 0
CHEST TIGHTNESS: 0
BACK PAIN: 1
COLOR CHANGE: 0
CONSTIPATION: 0
SHORTNESS OF BREATH: 0
ALLERGIC/IMMUNOLOGIC NEGATIVE: 1
ABDOMINAL DISTENTION: 0
CHEST TIGHTNESS: 0
RHINORRHEA: 0
COUGH: 0
BLOOD IN STOOL: 0
ABDOMINAL PAIN: 0
SHORTNESS OF BREATH: 0
COLOR CHANGE: 1

## 2020-07-17 NOTE — PROGRESS NOTES
Subjective  Chief Complaint   Patient presents with    Cyst     cyst on back x few years, states that he has had it lanced in the past but it has came back and has been getting bigger over time.  Weight Loss     pt states that he has been on a diet and has lost a lot of weight over the past few months and he noticed his blood pressure has been a lot lower than usual.        HPI    Pt here to discuss cyst on back. Been there for years, had it lanced several years ago by Dr. Danilo Owens. Has been fine for years; however has been getting larger and painful for the last 3 weeks to 1 month. Very sore, hard to sleep, difficult to sleep, gets bigger as the day goes on. Has been applying hot compresses. Started a diet on June 1st and has lost weight and noticed his bp has been running lower. No symptoms, feels fine. BP has been running around 106/60s-112/60s.       Past Medical History:   Diagnosis Date    Celiac disease     Hypercholesterolemia     Hypertension      Patient Active Problem List    Diagnosis Date Noted    Seasonal allergies 05/16/2016    Tachycardia 04/17/2015    Left inguinal hernia 17/22/7284    Umbilical hernia 78/22/6076    Celiac disease 03/09/2015    Breast lump on right side at 6 o'clock position 10/23/2014    Hypertension     Hypercholesterolemia      Past Surgical History:   Procedure Laterality Date    COLONOSCOPY  12/19/11    normal    HERNIA REPAIR Left 5/8/15    repair of ventral hernia with mesh and left inguinal hernia repair Dr Christie Damon Right     TONSILLECTOMY       Family History   Problem Relation Age of Onset    High Blood Pressure Sister     High Blood Pressure Mother      Social History     Socioeconomic History    Marital status:      Spouse name: None    Number of children: None    Years of education: None    Highest education level: None   Occupational History    None   Social Needs    Financial resource strain: Not hard at all    Food insecurity     Worry: Never true     Inability: Never true    Transportation needs     Medical: No     Non-medical: No   Tobacco Use    Smoking status: Never Smoker    Smokeless tobacco: Never Used   Substance and Sexual Activity    Alcohol use: No    Drug use: No    Sexual activity: None   Lifestyle    Physical activity     Days per week: None     Minutes per session: None    Stress: None   Relationships    Social connections     Talks on phone: None     Gets together: None     Attends Oriental orthodox service: None     Active member of club or organization: None     Attends meetings of clubs or organizations: None     Relationship status: None    Intimate partner violence     Fear of current or ex partner: None     Emotionally abused: None     Physically abused: None     Forced sexual activity: None   Other Topics Concern    None   Social History Narrative    None     Current Outpatient Medications on File Prior to Visit   Medication Sig Dispense Refill    olmesartan-hydrochlorothiazide (BENICAR HCT) 40-12.5 MG per tablet Take 1 tablet by mouth every day 90 tablet 1    rosuvastatin (CRESTOR) 5 MG tablet TAKE 1 TABLET DAILY 90 tablet 1    amLODIPine (NORVASC) 5 MG tablet TAKE 1 TABLET TWICE A  tablet 1     No current facility-administered medications on file prior to visit. Allergies   Allergen Reactions    Buspar [Buspirone Hcl] Other (See Comments)     Chills and decreased appetite.  Lopressor [Metoprolol]      Light headed       Review of Systems   Constitutional: Negative for chills, diaphoresis, fatigue and fever. HENT: Negative for congestion and ear pain. Respiratory: Negative for cough, chest tightness and shortness of breath. Cardiovascular: Negative for chest pain, palpitations and leg swelling. Musculoskeletal: Negative for arthralgias and back pain. Skin: Positive for color change. Neurological: Negative for dizziness and headaches. Psychiatric/Behavioral: Negative for dysphoric mood. The patient is not nervous/anxious. Objective  Vitals:    07/17/20 0756   BP: 112/66   Pulse: 83   Temp: 97.4 °F (36.3 °C)   SpO2: 99%   Weight: 213 lb 6.4 oz (96.8 kg)   Height: 6' 4\" (1.93 m)     Physical Exam  Constitutional:       General: He is not in acute distress. Appearance: Normal appearance. He is normal weight. He is not ill-appearing or toxic-appearing. HENT:      Head: Normocephalic and atraumatic. Right Ear: External ear normal.      Left Ear: External ear normal.   Neck:      Musculoskeletal: Normal range of motion and neck supple. No muscular tenderness. Cardiovascular:      Rate and Rhythm: Normal rate and regular rhythm. Pulses: Normal pulses. Heart sounds: Normal heart sounds. No murmur. Pulmonary:      Effort: Pulmonary effort is normal. No respiratory distress. Breath sounds: Normal breath sounds. No stridor. No wheezing, rhonchi or rales. Chest:      Chest wall: No tenderness. Musculoskeletal: Normal range of motion. Right lower leg: No edema. Left lower leg: No edema. Lymphadenopathy:      Cervical: No cervical adenopathy. Skin:     General: Skin is warm and dry. Capillary Refill: Capillary refill takes less than 2 seconds. Coloration: Skin is not jaundiced or pale. Findings: Erythema present. No bruising, lesion or rash. Neurological:      General: No focal deficit present. Mental Status: He is alert and oriented to person, place, and time. Mental status is at baseline. Cranial Nerves: No cranial nerve deficit. Coordination: Coordination normal.      Gait: Gait normal.   Psychiatric:         Mood and Affect: Mood normal.         Behavior: Behavior normal.         Thought Content: Thought content normal.         Judgment: Judgment normal.         Assessment& Plan    1. Abscess of back  Urgent referral for abscess, to be seen today.  ATB per surgeon. - Hollis Hebert MD, General [facility]    2. Essential hypertension  Blood pressure is at goal and is well controlled. Pt is asymptomatic. Will continue current regimen; however he will continue to monitor bp and if it dips below 100/60 or if he becomes symptomatic, he will call in so we can adjust his medication regimen accordingly. Side effects, adverse effects of the medication prescribed today, as well as treatment plan/ rationale and result expectations have been discussed with the patient who expresses understanding and desires to proceed. Close follow up to evaluate treatment results and for coordination of care. I have reviewed the patient's medical history in detail and updated the computerized patient record. As always, patient is advised that if symptoms worsen in any way they will proceed to the nearest emergency room. Orders Placed This Encounter   Procedures   Hollis Hebert MD, General SurgeryJeovany     Referral Priority:   Urgent     Referral Type:   Eval and Treat     Referral Reason:   Specialty Services Required     Referred to Provider:   Gretchen Red MD     Requested Specialty:   General Surgery     Number of Visits Requested:   1       No orders of the defined types were placed in this encounter. Return if symptoms worsen or fail to improve.     René Calloway, APRN - CNP

## 2020-07-17 NOTE — PROGRESS NOTES
Subjective:      Patient ID: Trish Ring is a 48 y.o. male. HPI   Trish Ring is a 48 y.o. male who is here today referred from ALLA Dunlap CNP for a back abscess. Patient first noted symptoms 3 weekago. He developed a tender lump. Patient also notes pain. He was seen at  a clinic. Incision & drainage has not been done. Cultures has not been done. The patient is not on anticoagulants. Patient is on antibiotics for the infection. He had an abscess in the same place about 10 years ago that had to be drained but the cyst was never removed. Review of Systems   Constitutional: Negative for activity change, appetite change and unexpected weight change. HENT: Negative for congestion, nosebleeds, postnasal drip, rhinorrhea and sneezing. Eyes: Negative. Negative for visual disturbance. Respiratory: Negative for chest tightness and shortness of breath. Cardiovascular: Negative for chest pain and leg swelling. Gastrointestinal: Negative for abdominal distention, abdominal pain, blood in stool and constipation. Endocrine: Negative. Genitourinary: Negative for difficulty urinating. Musculoskeletal: Positive for back pain. Negative for arthralgias, gait problem and myalgias. Skin: Negative for color change. Allergic/Immunologic: Negative. Neurological: Negative for dizziness, light-headedness, numbness and headaches. Hematological: Does not bruise/bleed easily. Psychiatric/Behavioral: Negative for sleep disturbance. Objective:   Physical Exam  Constitutional:       General: He is not in acute distress. Appearance: Normal appearance. HENT:      Mouth/Throat:      Mouth: Mucous membranes are moist.      Pharynx: Oropharynx is clear. Eyes:      Pupils: Pupils are equal, round, and reactive to light.    Neck:      Comments: Neck is supple without any masses, no thyromegaly, trachea midline  Pulmonary:       Musculoskeletal:      Comments: Normal gait   Skin:

## 2020-07-20 LAB
GRAM STAIN RESULT: ABNORMAL
WOUND/ABSCESS: ABNORMAL

## 2020-07-31 ENCOUNTER — OFFICE VISIT (OUTPATIENT)
Dept: SURGERY | Age: 55
End: 2020-07-31
Payer: COMMERCIAL

## 2020-07-31 VITALS
SYSTOLIC BLOOD PRESSURE: 108 MMHG | WEIGHT: 208 LBS | HEIGHT: 76 IN | TEMPERATURE: 97.4 F | BODY MASS INDEX: 25.33 KG/M2 | DIASTOLIC BLOOD PRESSURE: 70 MMHG

## 2020-07-31 PROCEDURE — G8417 CALC BMI ABV UP PARAM F/U: HCPCS | Performed by: SURGERY

## 2020-07-31 PROCEDURE — 3017F COLORECTAL CA SCREEN DOC REV: CPT | Performed by: SURGERY

## 2020-07-31 PROCEDURE — 1036F TOBACCO NON-USER: CPT | Performed by: SURGERY

## 2020-07-31 PROCEDURE — 99212 OFFICE O/P EST SF 10 MIN: CPT | Performed by: SURGERY

## 2020-07-31 PROCEDURE — G8427 DOCREV CUR MEDS BY ELIG CLIN: HCPCS | Performed by: SURGERY

## 2020-07-31 NOTE — PROGRESS NOTES
Subjective:      Patient ID: Aure Jenkins is a 48 y.o. male. HPI   Aure Jenkins is status post Incision and drainage of a back abscess on 7/17/2020. Cultures showed no growth. The packing has been removed. The wound is not draining. Pain is rated as a 0. He is back to normal activities. He has finished his antibiotics today. Review of Systems  14 systems reviewed and negative other than history of present illness  Objective:   Physical Exam  Constitutional:       General: He is not in acute distress. Appearance: Normal appearance. HENT:      Mouth/Throat:      Mouth: Mucous membranes are moist.      Pharynx: Oropharynx is clear. Eyes:      Pupils: Pupils are equal, round, and reactive to light. Neck:      Comments: Neck is supple without any masses, no thyromegaly, trachea midline  Pulmonary:       Musculoskeletal:      Comments: Normal gait   Skin:     Findings: No bruising, lesion or rash. Neurological:      Mental Status: He is alert and oriented to person, place, and time. Psychiatric:         Mood and Affect: Mood normal.         Judgment: Judgment normal.       /70   Temp 97.4 °F (36.3 °C) (Temporal)   Ht 6' 4\" (1.93 m)   Wt 208 lb (94.3 kg)   BMI 25.32 kg/m²   Assessment:      Satisfactory course      Plan:      Return to see me as needed  He was offered the option of excision versus monitoring and he wishes to just monitor it for now and if he shows any other signs of problems he will call me immediately for antibiotics excision.   He does understand the possibility of recurrence of the infection        Morelia Fonseca MD

## 2020-11-09 ENCOUNTER — OFFICE VISIT (OUTPATIENT)
Dept: FAMILY MEDICINE CLINIC | Age: 55
End: 2020-11-09
Payer: COMMERCIAL

## 2020-11-09 VITALS
HEART RATE: 62 BPM | SYSTOLIC BLOOD PRESSURE: 112 MMHG | DIASTOLIC BLOOD PRESSURE: 64 MMHG | HEIGHT: 76 IN | TEMPERATURE: 96.8 F | OXYGEN SATURATION: 99 % | BODY MASS INDEX: 24.45 KG/M2 | WEIGHT: 200.8 LBS

## 2020-11-09 DIAGNOSIS — E78.00 HYPERCHOLESTEROLEMIA: ICD-10-CM

## 2020-11-09 DIAGNOSIS — Z12.5 SCREENING PSA (PROSTATE SPECIFIC ANTIGEN): ICD-10-CM

## 2020-11-09 DIAGNOSIS — I10 ESSENTIAL HYPERTENSION: ICD-10-CM

## 2020-11-09 LAB
ALBUMIN SERPL-MCNC: 4.5 G/DL (ref 3.5–4.6)
ALP BLD-CCNC: 56 U/L (ref 35–104)
ALT SERPL-CCNC: 17 U/L (ref 0–41)
ANION GAP SERPL CALCULATED.3IONS-SCNC: 13 MEQ/L (ref 9–15)
AST SERPL-CCNC: 16 U/L (ref 0–40)
BILIRUB SERPL-MCNC: 0.4 MG/DL (ref 0.2–0.7)
BUN BLDV-MCNC: 18 MG/DL (ref 6–20)
CALCIUM SERPL-MCNC: 9.5 MG/DL (ref 8.5–9.9)
CHLORIDE BLD-SCNC: 107 MEQ/L (ref 95–107)
CHOLESTEROL, TOTAL: 126 MG/DL (ref 0–199)
CO2: 25 MEQ/L (ref 20–31)
CREAT SERPL-MCNC: 0.82 MG/DL (ref 0.7–1.2)
GFR AFRICAN AMERICAN: >60
GFR NON-AFRICAN AMERICAN: >60
GLOBULIN: 2.5 G/DL (ref 2.3–3.5)
GLUCOSE BLD-MCNC: 88 MG/DL (ref 70–99)
HDLC SERPL-MCNC: 50 MG/DL (ref 40–59)
LDL CHOLESTEROL CALCULATED: 64 MG/DL (ref 0–129)
POTASSIUM SERPL-SCNC: 4 MEQ/L (ref 3.4–4.9)
PROSTATE SPECIFIC ANTIGEN: 1.92 NG/ML (ref 0–3.89)
SODIUM BLD-SCNC: 145 MEQ/L (ref 135–144)
TOTAL PROTEIN: 7 G/DL (ref 6.3–8)
TRIGL SERPL-MCNC: 59 MG/DL (ref 0–150)

## 2020-11-09 PROCEDURE — 3017F COLORECTAL CA SCREEN DOC REV: CPT | Performed by: NURSE PRACTITIONER

## 2020-11-09 PROCEDURE — 90471 IMMUNIZATION ADMIN: CPT | Performed by: NURSE PRACTITIONER

## 2020-11-09 PROCEDURE — G8427 DOCREV CUR MEDS BY ELIG CLIN: HCPCS | Performed by: NURSE PRACTITIONER

## 2020-11-09 PROCEDURE — 90688 IIV4 VACCINE SPLT 0.5 ML IM: CPT | Performed by: NURSE PRACTITIONER

## 2020-11-09 PROCEDURE — G8482 FLU IMMUNIZE ORDER/ADMIN: HCPCS | Performed by: NURSE PRACTITIONER

## 2020-11-09 PROCEDURE — 99214 OFFICE O/P EST MOD 30 MIN: CPT | Performed by: NURSE PRACTITIONER

## 2020-11-09 PROCEDURE — 1036F TOBACCO NON-USER: CPT | Performed by: NURSE PRACTITIONER

## 2020-11-09 PROCEDURE — G8420 CALC BMI NORM PARAMETERS: HCPCS | Performed by: NURSE PRACTITIONER

## 2020-11-09 RX ORDER — ROSUVASTATIN CALCIUM 5 MG/1
TABLET, COATED ORAL
Qty: 90 TABLET | Refills: 1 | Status: SHIPPED | OUTPATIENT
Start: 2020-11-09 | End: 2021-05-03 | Stop reason: SDUPTHER

## 2020-11-09 RX ORDER — OLMESARTAN MEDOXOMIL AND HYDROCHLOROTHIAZIDE 40/12.5 40; 12.5 MG/1; MG/1
TABLET ORAL
Qty: 90 TABLET | Refills: 1 | Status: SHIPPED | OUTPATIENT
Start: 2020-11-09 | End: 2021-05-03 | Stop reason: SDUPTHER

## 2020-11-09 RX ORDER — OLMESARTAN MEDOXOMIL AND HYDROCHLOROTHIAZIDE 40/12.5 40; 12.5 MG/1; MG/1
TABLET ORAL
Qty: 90 TABLET | Refills: 1 | Status: CANCELLED | OUTPATIENT
Start: 2020-11-09

## 2020-11-09 RX ORDER — AMLODIPINE BESYLATE 5 MG/1
TABLET ORAL
Qty: 180 TABLET | Refills: 1 | Status: CANCELLED | OUTPATIENT
Start: 2020-11-09

## 2020-11-09 ASSESSMENT — ENCOUNTER SYMPTOMS
DIARRHEA: 0
COUGH: 0
SHORTNESS OF BREATH: 0
CONSTIPATION: 0

## 2020-11-09 NOTE — PROGRESS NOTES
05/16/2016    Tachycardia 04/17/2015    Left inguinal hernia 07/00/8857    Umbilical hernia 71/83/6754    Celiac disease 03/09/2015    Breast lump on right side at 6 o'clock position 10/23/2014    Hypertension     Hypercholesterolemia      Past Medical History:   Diagnosis Date    Celiac disease     Hypercholesterolemia     Hypertension      Past Surgical History:   Procedure Laterality Date    COLONOSCOPY  12/19/11    normal    HERNIA REPAIR Left 5/8/15    repair of ventral hernia with mesh and left inguinal hernia repair Dr Cheri Woo Right     TONSILLECTOMY       Family History   Problem Relation Age of Onset    High Blood Pressure Sister     High Blood Pressure Mother      Social History     Socioeconomic History    Marital status:      Spouse name: None    Number of children: None    Years of education: None    Highest education level: None   Occupational History    None   Social Needs    Financial resource strain: Not hard at all   [place] insecurity     Worry: Never true     Inability: Never true    Transportation needs     Medical: No     Non-medical: No   Tobacco Use    Smoking status: Never Smoker    Smokeless tobacco: Never Used   Substance and Sexual Activity    Alcohol use: No    Drug use: No    Sexual activity: None   Lifestyle    Physical activity     Days per week: None     Minutes per session: None    Stress: None   Relationships    Social connections     Talks on phone: None     Gets together: None     Attends Mandaeism service: None     Active member of club or organization: None     Attends meetings of clubs or organizations: None     Relationship status: None    Intimate partner violence     Fear of current or ex partner: None     Emotionally abused: None     Physically abused: None     Forced sexual activity: None   Other Topics Concern    None   Social History Narrative    None     Current Outpatient Medications on File Prior to oriented to person, place, and time. Mental status is at baseline. Psychiatric:         Mood and Affect: Mood normal.         Behavior: Behavior normal.         Thought Content: Thought content normal.         Judgment: Judgment normal.       Assessment & Plan     Diagnosis Orders   1. Essential hypertension  olmesartan-hydroCHLOROthiazide (BENICAR HCT) 40-12.5 MG per tablet    Comprehensive Metabolic Panel   2. Hypercholesterolemia  rosuvastatin (CRESTOR) 5 MG tablet    Lipid Panel   3. Need for influenza vaccination  INFLUENZA, QUADV, 3 YRS AND OLDER, IM, MDV, 0.5ML (AFLURIA QUADV)   4. Screening PSA (prostate specific antigen)  Psa screening   5. Hip pain  Instructed to use voltaren gel prn otc or routine to office for imaging if not improving       Orders Placed This Encounter   Procedures    INFLUENZA, QUADV, 3 YRS AND OLDER, IM, MDV, 0.5ML (AFLURIA QUADV)    Lipid Panel     Standing Status:   Future     Standing Expiration Date:   11/9/2021     Order Specific Question:   Is Patient Fasting?/# of Hours     Answer:   12    Comprehensive Metabolic Panel     Standing Status:   Future     Standing Expiration Date:   11/9/2021    Psa screening     Standing Status:   Future     Standing Expiration Date:   11/9/2021       Orders Placed This Encounter   Medications    rosuvastatin (CRESTOR) 5 MG tablet     Sig: TAKE 1 TABLET DAILY     Dispense:  90 tablet     Refill:  1    olmesartan-hydroCHLOROthiazide (BENICAR HCT) 40-12.5 MG per tablet     Sig: Take 1 tablet by mouth every day     Dispense:  90 tablet     Refill:  1     Refill [id number]     Side effects, adverse effects of the medication prescribed today, as well as treatment plan/ rationale and result expectations have been discussed with the patient who expresses understanding and desires to proceed. Close follow up to evaluate treatment results and for coordination of care.   I have reviewed the patient's medical history in detail and updated the computerized patient record. As always, patient is advised that if symptoms worsen in any way they will proceed to the nearest emergency room. Fu in 6 mos.      ALLA Du - CNP

## 2020-11-09 NOTE — PROGRESS NOTES
After obtaining consent, and per orders of Dr. Gretchen Gaucher, injection of influenza given in Left deltoid by Suzi Purvis. Patient instructed to remain in clinic for 20 minutes afterwards, and to report any adverse reaction to me immediately. Vaccine Information Sheet, \"Influenza - Inactivated\"  given to Annel Almeida, or parent/legal guardian of  Annel Almeida and verbalized understanding. Patient responses:    Have you ever had a reaction to a flu vaccine? No  Are you able to eat eggs without adverse effects? Yes  Do you have any current illness? No  Have you ever had Guillian Tiff Syndrome? No    Flu vaccine given per order. Please see immunization tab.

## 2020-11-22 ENCOUNTER — VIRTUAL VISIT (OUTPATIENT)
Dept: FAMILY MEDICINE CLINIC | Age: 55
End: 2020-11-22
Payer: COMMERCIAL

## 2020-11-22 DIAGNOSIS — Z20.822 ENCOUNTER BY TELEHEALTH FOR SUSPECTED COVID-19: ICD-10-CM

## 2020-11-22 PROCEDURE — 99213 OFFICE O/P EST LOW 20 MIN: CPT | Performed by: NURSE PRACTITIONER

## 2020-11-22 PROCEDURE — 3017F COLORECTAL CA SCREEN DOC REV: CPT | Performed by: NURSE PRACTITIONER

## 2020-11-22 PROCEDURE — G8427 DOCREV CUR MEDS BY ELIG CLIN: HCPCS | Performed by: NURSE PRACTITIONER

## 2020-11-22 ASSESSMENT — ENCOUNTER SYMPTOMS
COUGH: 0
RHINORRHEA: 0
CHEST TIGHTNESS: 0
SHORTNESS OF BREATH: 0
WHEEZING: 0
SORE THROAT: 0
DIARRHEA: 0

## 2020-11-22 NOTE — PROGRESS NOTES
The location for this appointment was the [facility] primary care site. TELEHEALTH APPOINTMENT  Patient has been screened to determine that this visit qualifies for a \"Video Visit\". This visit was via video due to the restrictions of the COVID-19 pandemic. All issues as below were discussed and addressed but note a limited visually based physical exam was performed. If it was felt the patient should be evaluated in the clinic there will be comment below demonstrating they were directed there. The patient is aware and has given verbal consent to be billed for this video encounter. The resources used for this visit were [employer] for chart access and [employer]. me    Chief Complaint   Patient presents with    Concern For [id number]     Pt stated he was feverish, low grade fever, and chills, body aches x3 days thursday. Fri, and saturday (got flu shot 11/09/20) felt like he had the flu. however, today he feels fine, his work is requiring for you to get COVID testing so he can go back to work. HPI  Patient is here for concern for covid. Work is requiring testing to return. Patient had symptoms 11/19-21. Symptoms have since resolved. Low grade (100.4-101), chills, body aches. Resolved with tylenol. No known exposure. PMH:    Current Outpatient Medications on File Prior to Visit   Medication Sig Dispense Refill    rosuvastatin (CRESTOR) 5 MG tablet TAKE 1 TABLET DAILY 90 tablet 1    olmesartan-hydroCHLOROthiazide (BENICAR HCT) 40-12.5 MG per tablet Take 1 tablet by mouth every day 90 tablet 1     No current facility-administered medications on file prior to visit.       Past Medical History:   Diagnosis Date    Celiac disease     Hypercholesterolemia     Hypertension      Past Surgical History:   Procedure Laterality Date    COLONOSCOPY  12/19/11    normal    HERNIA REPAIR Left 5/8/15    repair of ventral hernia with mesh and left inguinal hernia repair Dr Diann Dockery PHYSICAL EXAM/ RESULTS    (Limited exam: only performed what is available through a visual exam during the video encounter as indicated due to the restrictions of the COVID-19 pandemic)    There were no vitals taken for this visit. Physical Exam  Vitals signs reviewed. Constitutional:       General: He is not in acute distress. Appearance: He is well-developed. He is not ill-appearing or diaphoretic. HENT:      Head: Normocephalic and atraumatic. Right Ear: External ear normal.      Left Ear: External ear normal.      Nose: Nose normal. No congestion or rhinorrhea. Neck:      Musculoskeletal: Normal range of motion. Pulmonary:      Effort: Pulmonary effort is normal. No respiratory distress. Neurological:      Mental Status: He is alert. Psychiatric:         Behavior: Behavior normal.         Recent Results (from the past 2016 hour(s))   Psa screening    Collection Time: 11/09/20  8:53 AM   Result Value Ref Range    PSA 1.92 0.00 - 3.89 ng/mL   Comprehensive Metabolic Panel    Collection Time: 11/09/20  8:53 AM   Result Value Ref Range    Sodium 145 (H) 135 - 144 mEq/L    Potassium 4.0 3.4 - 4.9 mEq/L    Chloride 107 95 - 107 mEq/L    CO2 25 20 - 31 mEq/L    Anion Gap 13 9 - 15 mEq/L    Glucose 88 70 - 99 mg/dL    BUN 18 6 - 20 mg/dL    CREATININE 0.82 0.70 - 1.20 mg/dL    GFR Non-African American >60.0 >60    GFR  >60.0 >60    Calcium 9.5 8.5 - 9.9 mg/dL    Total Protein 7.0 6.3 - 8.0 g/dL    Alb 4.5 3.5 - 4.6 g/dL    Total Bilirubin 0.4 0.2 - 0.7 mg/dL    Alkaline Phosphatase 56 35 - 104 U/L    ALT 17 0 - 41 U/L    AST 16 0 - 40 U/L    Globulin 2.5 2.3 - 3.5 g/dL   Lipid Panel    Collection Time: 11/09/20  8:53 AM   Result Value Ref Range    Cholesterol, Total 126 0 - 199 mg/dL    Triglycerides 59 0 - 150 mg/dL    HDL 50 40 - 59 mg/dL    LDL Calculated 64 0 - 129 mg/dL           Assessment:       Diagnosis Orders   1.  Encounter by telehealth for suspected COVID-19 COVID-19 Ambulatory         Orders Placed This Encounter   Procedures    COVID-19 Ambulatory     Standing Status:   Future     Standing Expiration Date:   11/22/2021     Scheduling Instructions:      Saline media preferred given current shortage of viral transport media but both acceptable     Order Specific Question:   Is this test for diagnosis or screening? Answer:   Diagnosis of ill patient     Order Specific Question:   Symptomatic for COVID-19 as defined by CDC? Answer:   Yes     Order Specific Question:   Date of Symptom Onset     Answer:   11/19/2020     Order Specific Question:   Hospitalized for COVID-19? Answer:   No     Order Specific Question:   Admitted to ICU for COVID-19? Answer:   No     Order Specific Question:   Employed in healthcare setting? Answer:   Yes     Order Specific Question:   Resident in a congregate (group) care setting? Answer:   No     Order Specific Question:   Pregnant: Answer:   No     Order Specific Question:   Previously tested for COVID-19? Answer:   No         Plan:   Return if symptoms worsen or fail to improve, for follow up with your PCP. Patient Instructions     Patient Education        Learning About Coronavirus [phone])  Coronavirus [phone]): Overview  What is coronavirus ([id number])? The coronavirus disease (COVID-19) is caused by a virus. It is an illness that was first found in December 2019. It has since spread worldwide. The virus can cause fever, cough, and trouble breathing. In severe cases, it can cause pneumonia and make it hard to breathe without help. It can cause death. This virus spreads person-to-person through droplets from coughing and sneezing. It can also spread when you are close to someone who is infected. And it can spread when you touch something that has the virus on it, such as a doorknob or a tabletop. Coronaviruses are a large group of viruses. They cause the common cold.  They also cause more serious illnesses like Middle East respiratory syndrome (MERS) and severe acute respiratory syndrome (SARS). COVID-19 is caused by a novel coronavirus. That means it's a new type that has not been seen in people before. How is COVID-19 treated? Mild illness can be treated at home, but more serious illness needs to be treated in the hospital. Treatment may include medicines to reduce symptoms, plus breathing support such as oxygen therapy or a ventilator. Other treatments, such as antiviral medicines, may help people who have COVID-19. What can you do to protect yourself from COVID-19? The best way to protect yourself from getting sick is to:  · Avoid areas where there is an outbreak. · Avoid contact with people who may be infected. · Avoid crowds and try to stay at least 6 feet away from other people. · Wash your hands often, especially after you cough or sneeze. Use soap and water, and scrub for at least 20 seconds. If soap and water aren't available, use an alcohol-based hand . · Avoid touching your mouth, nose, and eyes. What can you do to avoid spreading the virus to others? To help avoid spreading the virus to others:  · Wash your hands often with soap or alcohol-based hand sanitizers. · Cover your mouth with a tissue when you cough or sneeze. Then throw the tissue in the trash. · Use a disinfectant to clean things that you touch often. These include doorknobs, remote controls, phones, and handles on your refrigerator and microwave. And don't forget countertops, tabletops, bathrooms, and computer keyboards. · Wear a cloth face cover if you have to go to public areas. If you know or suspect that you have COVID-19:  · Stay home. Don't go to school, work, or public areas. And don't use public transportation, ride-shares, or taxis unless you have no choice. · Leave your home only if you need to get medical care or testing. But call the doctor's office first so they know you're coming.  And wear a face cover. · Limit contact with people in your home. If possible, stay in a separate bedroom and use a separate bathroom. · Wear a face cover whenever you're around other people. It can help stop the spread of the virus when you cough or sneeze. · Clean and disinfect your home every day. Use household  and disinfectant wipes or sprays. Take special care to clean things that you grab with your hands. · Self-isolate until it's safe to be around others again. ? If you have symptoms, it's safe when you haven't had a fever for 3 days and your symptoms have improved and it's been at least 10 days since your symptoms started. ? If you were exposed to the virus but don't have symptoms, it's safe to be around others 14 days after exposure. ? Talk to your doctor about whether you also need testing, especially if you have a weakened immune system. When to call for help  Call 911 anytime you think you may need emergency care. For example, call if:  · You have severe trouble breathing. (You can't talk at all.)  · You have constant chest pain or pressure. · You are severely dizzy or lightheaded. · You are confused or can't think clearly. · Your face and lips have a blue color. · You passed out (lost consciousness) or are very hard to wake up. Call your doctor now if you develop symptoms such as:  · Shortness of breath. · Fever. · Cough. If you need to get care, call ahead to the doctor's office for instructions before you go. Make sure you wear a face cover to prevent exposing other people to the virus. Where can you get the latest information? The following health organizations are tracking and studying this virus. Their websites contain the most up-to-date information. Safia Snow also learn what to do if you think you may have been exposed to the virus. · U.S. Centers for Disease Control and Prevention (CDC): The CDC provides updated news about the disease and travel advice.  The website also tells you

## 2020-11-22 NOTE — PATIENT INSTRUCTIONS
Patient Education        Learning About Coronavirus [phone])  Coronavirus [phone]): Overview  What is coronavirus ([id number]-09)? The coronavirus disease (COVID-19) is caused by a virus. It is an illness that was first found in December 2019. It has since spread worldwide. The virus can cause fever, cough, and trouble breathing. In severe cases, it can cause pneumonia and make it hard to breathe without help. It can cause death. This virus spreads person-to-person through droplets from coughing and sneezing. It can also spread when you are close to someone who is infected. And it can spread when you touch something that has the virus on it, such as a doorknob or a tabletop. Coronaviruses are a large group of viruses. They cause the common cold. They also cause more serious illnesses like Middle East respiratory syndrome (MERS) and severe acute respiratory syndrome (SARS). COVID-19 is caused by a novel coronavirus. That means it's a new type that has not been seen in people before. How is COVID-19 treated? Mild illness can be treated at home, but more serious illness needs to be treated in the hospital. Treatment may include medicines to reduce symptoms, plus breathing support such as oxygen therapy or a ventilator. Other treatments, such as antiviral medicines, may help people who have COVID-19. What can you do to protect yourself from COVID-19? The best way to protect yourself from getting sick is to:  · Avoid areas where there is an outbreak. · Avoid contact with people who may be infected. · Avoid crowds and try to stay at least 6 feet away from other people. · Wash your hands often, especially after you cough or sneeze. Use soap and water, and scrub for at least 20 seconds. If soap and water aren't available, use an alcohol-based hand . · Avoid touching your mouth, nose, and eyes. What can you do to avoid spreading the virus to others?   To help avoid spreading the virus to others:  · [employer] your hands often with soap or alcohol-based hand sanitizers. · Cover your mouth with a tissue when you cough or sneeze. Then throw the tissue in the trash. · Use a disinfectant to clean things that you touch often. These include doorknobs, remote controls, phones, and handles on your refrigerator and microwave. And don't forget countertops, tabletops, bathrooms, and computer keyboards. · Wear a cloth face cover if you have to go to public areas. If you know or suspect that you have COVID-19:  · Stay home. Don't go to school, work, or public areas. And don't use public transportation, ride-shares, or taxis unless you have no choice. · Leave your home only if you need to get medical care or testing. But call the doctor's office first so they know you're coming. And wear a face cover. · Limit contact with people in your home. If possible, stay in a separate bedroom and use a separate bathroom. · Wear a face cover whenever you're around other people. It can help stop the spread of the virus when you cough or sneeze. · Clean and disinfect your home every day. Use household  and disinfectant wipes or sprays. Take special care to clean things that you grab with your hands. · Self-isolate until it's safe to be around others again. ? If you have symptoms, it's safe when you haven't had a fever for 3 days and your symptoms have improved and it's been at least 10 days since your symptoms started. ? If you were exposed to the virus but don't have symptoms, it's safe to be around others 14 days after exposure. ? Talk to your doctor about whether you also need testing, especially if you have a weakened immune system. When to call for help  Call 911 anytime you think you may need emergency care. For example, call if:  · You have severe trouble breathing. (You can't talk at all.)  · You have constant chest pain or pressure. · You are severely dizzy or lightheaded.   · You are confused or can't think clearly. · Your face and lips have a blue color. · You passed out (lost consciousness) or are very hard to wake up. Call your doctor now if you develop symptoms such as:  · Shortness of breath. · Fever. · Cough. If you need to get care, call ahead to the doctor's office for instructions before you go. Make sure you wear a face cover to prevent exposing other people to the virus. Where can you get the latest information? The following health organizations are tracking and studying this virus. Their websites contain the most up-to-date information. Samina Dempsey also learn what to do if you think you may have been exposed to the virus. · U.S. Centers for Disease Control and Prevention (CDC): The CDC provides updated news about the disease and travel advice. The website also tells you how to prevent the spread of infection. www.cdc.gov  · World Health Organization [facility]): WHO offers information about the virus outbreaks. WHO also has travel advice. www.who.int  Current as of: July 10, 2020               Content Version: 12.6  © 2006-2020 [employer]. Care instructions adapted under license by [facility] ([facility]). If you have questions about a medical condition or this instruction, always ask your healthcare professional. [address] any warranty or liability for your use of this information.

## 2020-11-25 LAB
SARS-COV-2: DETECTED
SOURCE: ABNORMAL

## 2020-11-25 RX ORDER — AZITHROMYCIN 250 MG/1
250 TABLET, FILM COATED ORAL DAILY
Qty: 6 TABLET | Refills: 0 | Status: SHIPPED | OUTPATIENT
Start: 2020-11-25 | End: 2020-11-30

## 2021-05-03 DIAGNOSIS — E78.00 HYPERCHOLESTEROLEMIA: ICD-10-CM

## 2021-05-03 DIAGNOSIS — I10 ESSENTIAL HYPERTENSION: ICD-10-CM

## 2021-05-03 RX ORDER — ROSUVASTATIN CALCIUM 5 MG/1
TABLET, COATED ORAL
Qty: 90 TABLET | Refills: 1 | Status: SHIPPED | OUTPATIENT
Start: 2021-05-03 | End: 2021-11-09 | Stop reason: SDUPTHER

## 2021-05-03 RX ORDER — OLMESARTAN MEDOXOMIL AND HYDROCHLOROTHIAZIDE 40/12.5 40; 12.5 MG/1; MG/1
TABLET ORAL
Qty: 90 TABLET | Refills: 1 | Status: SHIPPED | OUTPATIENT
Start: 2021-05-03 | End: 2021-11-09 | Stop reason: SDUPTHER

## 2021-11-09 DIAGNOSIS — E78.00 HYPERCHOLESTEROLEMIA: ICD-10-CM

## 2021-11-09 DIAGNOSIS — I10 ESSENTIAL HYPERTENSION: ICD-10-CM

## 2021-11-10 ENCOUNTER — TELEPHONE (OUTPATIENT)
Dept: FAMILY MEDICINE CLINIC | Age: 56
End: 2021-11-10

## 2021-11-10 NOTE — TELEPHONE ENCOUNTER
Future Appointments    This patient does not currently have any appointments scheduled.     Recent Visits    11/22/2020 Encounter by telehealth for suspected [address] A ALLA Fonseca CNP   11/09/2020 Essential hypertension   [id number] [facility], APRN - CNP

## 2021-11-10 NOTE — TELEPHONE ENCOUNTER
----- Message from [facility] sent at 11/10/2021 11:33 AM EST -----  Subject: Refill Request    QUESTIONS  Name of Medication? rosuvastatin (CRESTOR) 5 MG tablet  Patient-reported dosage and instructions? 1x daily  How many days do you have left? 14  Preferred Pharmacy? [address] phone number (if available)? [phone]  ---------------------------------------------------------------------------  --------------,  Name of Medication? olmesartan-hydroCHLOROthiazide (BENICAR HCT) 40-12.5   MG per tablet  Patient-reported dosage and instructions? 1x daily  How many days do you have left? 14  Preferred Pharmacy? [address] phone number (if available)? [phone]  ---------------------------------------------------------------------------  --------------  CALL BACK INFO  What is the best way for the office to contact you? OK to leave message on   voicemail  Preferred Call Back Phone Number?  [phone]

## 2021-11-11 RX ORDER — OLMESARTAN MEDOXOMIL AND HYDROCHLOROTHIAZIDE 40/12.5 40; 12.5 MG/1; MG/1
TABLET ORAL
Qty: 90 TABLET | Refills: 1 | Status: SHIPPED | OUTPATIENT
Start: 2021-11-11 | End: 2022-06-01 | Stop reason: SDUPTHER

## 2021-11-11 RX ORDER — ROSUVASTATIN CALCIUM 5 MG/1
TABLET, COATED ORAL
Qty: 90 TABLET | Refills: 1 | Status: SHIPPED | OUTPATIENT
Start: 2021-11-11 | End: 2022-06-01 | Stop reason: SDUPTHER

## 2021-11-12 ENCOUNTER — OFFICE VISIT (OUTPATIENT)
Dept: FAMILY MEDICINE CLINIC | Age: 56
End: 2021-11-12
Payer: COMMERCIAL

## 2021-11-12 VITALS
HEART RATE: 79 BPM | SYSTOLIC BLOOD PRESSURE: 120 MMHG | DIASTOLIC BLOOD PRESSURE: 82 MMHG | HEIGHT: 76 IN | WEIGHT: 202.6 LBS | OXYGEN SATURATION: 98 % | BODY MASS INDEX: 24.67 KG/M2 | TEMPERATURE: 98.2 F

## 2021-11-12 DIAGNOSIS — K59.00 CONSTIPATION, UNSPECIFIED CONSTIPATION TYPE: ICD-10-CM

## 2021-11-12 DIAGNOSIS — R39.15 URINARY URGENCY: Primary | ICD-10-CM

## 2021-11-12 LAB
BILIRUBIN, POC: NORMAL
BLOOD URINE, POC: NORMAL
CLARITY, POC: CLEAR
COLOR, POC: YELLOW
GLUCOSE URINE, POC: NORMAL
KETONES, POC: NORMAL
LEUKOCYTE EST, POC: NORMAL
NITRITE, POC: NORMAL
PH, POC: 7.5
PROTEIN, POC: NORMAL
SPECIFIC GRAVITY, POC: 1.01
UROBILINOGEN, POC: NORMAL

## 2021-11-12 PROCEDURE — G8484 FLU IMMUNIZE NO ADMIN: HCPCS | Performed by: NURSE PRACTITIONER

## 2021-11-12 PROCEDURE — 1036F TOBACCO NON-USER: CPT | Performed by: NURSE PRACTITIONER

## 2021-11-12 PROCEDURE — G8420 CALC BMI NORM PARAMETERS: HCPCS | Performed by: NURSE PRACTITIONER

## 2021-11-12 PROCEDURE — 3017F COLORECTAL CA SCREEN DOC REV: CPT | Performed by: NURSE PRACTITIONER

## 2021-11-12 PROCEDURE — 99213 OFFICE O/P EST LOW 20 MIN: CPT | Performed by: NURSE PRACTITIONER

## 2021-11-12 PROCEDURE — G8427 DOCREV CUR MEDS BY ELIG CLIN: HCPCS | Performed by: NURSE PRACTITIONER

## 2021-11-12 PROCEDURE — 81002 URINALYSIS NONAUTO W/O SCOPE: CPT | Performed by: NURSE PRACTITIONER

## 2021-11-12 RX ORDER — POLYETHYLENE GLYCOL 3350 17 G/17G
17 POWDER, FOR SOLUTION ORAL DAILY PRN
Qty: 510 G | Refills: 0 | Status: SHIPPED | OUTPATIENT
Start: 2021-11-12 | End: 2021-12-12

## 2021-11-12 SDOH — ECONOMIC STABILITY: FOOD INSECURITY: WITHIN THE PAST 12 MONTHS, THE FOOD YOU BOUGHT JUST DIDN'T LAST AND YOU DIDN'T HAVE MONEY TO GET MORE.: NEVER TRUE

## 2021-11-12 SDOH — ECONOMIC STABILITY: FOOD INSECURITY: WITHIN THE PAST 12 MONTHS, YOU WORRIED THAT YOUR FOOD WOULD RUN OUT BEFORE YOU GOT MONEY TO BUY MORE.: NEVER TRUE

## 2021-11-12 ASSESSMENT — ENCOUNTER SYMPTOMS
ABDOMINAL DISTENTION: 0
ABDOMINAL PAIN: 0
DIARRHEA: 0
NAUSEA: 0

## 2021-11-12 ASSESSMENT — PATIENT HEALTH QUESTIONNAIRE - PHQ9
SUM OF ALL RESPONSES TO PHQ9 QUESTIONS 1 & 2: 0
1. LITTLE INTEREST OR PLEASURE IN DOING THINGS: 0
SUM OF ALL RESPONSES TO PHQ QUESTIONS 1-9: 0
2. FEELING DOWN, DEPRESSED OR HOPELESS: 0
SUM OF ALL RESPONSES TO PHQ QUESTIONS 1-9: 0
SUM OF ALL RESPONSES TO PHQ QUESTIONS 1-9: 0

## 2021-11-12 ASSESSMENT — SOCIAL DETERMINANTS OF HEALTH (SDOH): HOW HARD IS IT FOR YOU TO PAY FOR THE VERY BASICS LIKE FOOD, HOUSING, MEDICAL CARE, AND HEATING?: NOT HARD AT ALL

## 2021-11-12 NOTE — PROGRESS NOTES
Subjective  Francisco Drummond, 55 y.o. male presents today with:  Chief Complaint   Patient presents with    Urinary Tract Infection     little painful, frequency, moderate amount, no odor. noticed 11/12. takes water pill early normally does not go this much. HPI   Presents to [facility] for urinary frequency   Urinary frequency began today   Denies low abdominal pain   Denies hematuria  Denies flank pain   Denies discharge  States mild dysuria   Denies fever or chills   Denies fatigue  Denies nausea   BM have been hard, circular small for awhile   Denies recent change to diuretic             Past Medical History:   Diagnosis Date    Celiac disease     Hypercholesterolemia     Hypertension       Past Surgical History:   Procedure Laterality Date    COLONOSCOPY  12/19/11    normal    HERNIA REPAIR Left 5/8/15    repair of ventral hernia with mesh and left inguinal hernia repair Dr Zack Crowder Right     TONSILLECTOMY       Family History   Problem Relation Age of Onset    High Blood Pressure Sister     High Blood Pressure Mother            Review of Systems   Constitutional: Negative for activity change, appetite change, chills, diaphoresis, fatigue and fever. Cardiovascular: Negative for chest pain and palpitations. Gastrointestinal: Negative for abdominal distention, abdominal pain, diarrhea and nausea. Genitourinary: Positive for frequency. Negative for decreased urine volume, difficulty urinating, dysuria, enuresis, flank pain, hematuria, penile discharge, penile pain, penile swelling, scrotal swelling, testicular pain and urgency. Neurological: Negative for dizziness, light-headedness and headaches. Psychiatric/Behavioral: Negative for sleep disturbance. PMH, Surgical Hx, Family Hx, and Social Hx reviewed and updated.               Objective  Vitals:    11/12/21 1640   BP: 120/82   Site: Right Upper Arm   Position: Sitting   Cuff Size: Medium Adult   Pulse: 79 Temp: 98.2 °F (36.8 °C)   TempSrc: Tympanic   SpO2: 98%   Weight: 202 lb 9.6 oz (91.9 kg)   Height: 6' 4\" (1.93 m)     BP Readings from Last 3 Encounters:   11/12/21 120/82   11/09/20 112/64   07/31/20 108/70     Wt Readings from Last 3 Encounters:   11/12/21 202 lb 9.6 oz (91.9 kg)   11/09/20 200 lb 12.8 oz (91.1 kg)   07/31/20 208 lb (94.3 kg)           Physical Exam  Vitals reviewed. Constitutional:       Appearance: Normal appearance. He is not ill-appearing or toxic-appearing. Eyes:      General: Lids are normal.      Conjunctiva/sclera: Conjunctivae normal.   Cardiovascular:      Rate and Rhythm: Normal rate. Pulmonary:      Effort: Pulmonary effort is normal.   Abdominal:      General: There is no distension. Palpations: Abdomen is soft. Tenderness: There is no abdominal tenderness. There is no right CVA tenderness or left CVA tenderness. Musculoskeletal:         General: Normal range of motion. Cervical back: Normal range of motion. Skin:     General: Skin is warm and dry. Coloration: Skin is not pale. Neurological:      General: No focal deficit present. Mental Status: He is alert and oriented to person, place, and time. Assessment & Plan    Diagnosis Orders   1. Urinary urgency  POCT Urinalysis no Micro    Culture, Urine   2. Constipation, unspecified constipation type  polyethylene glycol (GLYCOLAX) 17 GM/SCOOP powder     Orders Placed This Encounter   Procedures    Culture, Urine     Standing Status:   Future     Standing Expiration Date:   11/12/2022     Order Specific Question:   Specify (ex-cath, midstream, cysto, etc)? Answer:   midstream    POCT Urinalysis no Micro     Orders Placed This Encounter   Medications    polyethylene glycol (GLYCOLAX) 17 GM/SCOOP powder     Sig: Take 17 g by mouth daily as needed (constipation)     Dispense:  510 g     Refill:  0     Return if symptoms worsen or fail to improve, for follow up with PCP.     Reviewed with the patient: current clinical status & that will await urine culture to guide treatment. Side effects, adverse effects of the medication prescribed today, as well as treatment plan/rationale and result expectations have been discussed with the patient who expressed understanding. Close follow up to evaluate treatment results and for coordination of care. I have reviewed the patient's medical history in detail and updated the computerized patient record.               ALLA Higginbotham - NP

## 2021-11-13 DIAGNOSIS — R39.15 URINARY URGENCY: ICD-10-CM

## 2021-11-14 LAB — URINE CULTURE, ROUTINE: NORMAL

## 2021-11-15 DIAGNOSIS — E78.00 HYPERCHOLESTEROLEMIA: Primary | ICD-10-CM

## 2021-11-15 DIAGNOSIS — I10 ESSENTIAL HYPERTENSION: ICD-10-CM

## 2021-11-15 DIAGNOSIS — Z12.5 SCREENING PSA (PROSTATE SPECIFIC ANTIGEN): ICD-10-CM

## 2021-11-24 ENCOUNTER — VIRTUAL VISIT (OUTPATIENT)
Dept: FAMILY MEDICINE CLINIC | Age: 56
End: 2021-11-24
Payer: COMMERCIAL

## 2021-11-24 ENCOUNTER — NURSE ONLY (OUTPATIENT)
Dept: FAMILY MEDICINE CLINIC | Age: 56
End: 2021-11-24
Payer: COMMERCIAL

## 2021-11-24 DIAGNOSIS — R05.9 COUGH: ICD-10-CM

## 2021-11-24 DIAGNOSIS — Z20.822 ENCOUNTER FOR LABORATORY TESTING FOR COVID-19 VIRUS: Primary | ICD-10-CM

## 2021-11-24 DIAGNOSIS — R52 BODY ACHES: ICD-10-CM

## 2021-11-24 DIAGNOSIS — B34.9 VIRAL ILLNESS: Primary | ICD-10-CM

## 2021-11-24 DIAGNOSIS — Z11.59 ENCOUNTER FOR SCREENING FOR VIRAL DISEASE: ICD-10-CM

## 2021-11-24 LAB
INFLUENZA A ANTIBODY: NORMAL
INFLUENZA B ANTIBODY: NORMAL
Lab: NORMAL
PERFORMING INSTRUMENT: NORMAL
QC PASS/FAIL: NORMAL
SARS-COV-2, POC: NORMAL

## 2021-11-24 PROCEDURE — 87426 SARSCOV CORONAVIRUS AG IA: CPT | Performed by: NURSE PRACTITIONER

## 2021-11-24 PROCEDURE — 87804 INFLUENZA ASSAY W/OPTIC: CPT | Performed by: NURSE PRACTITIONER

## 2021-11-24 PROCEDURE — 3017F COLORECTAL CA SCREEN DOC REV: CPT | Performed by: NURSE PRACTITIONER

## 2021-11-24 PROCEDURE — 99000 SPECIMEN HANDLING OFFICE-LAB: CPT | Performed by: NURSE PRACTITIONER

## 2021-11-24 PROCEDURE — G8427 DOCREV CUR MEDS BY ELIG CLIN: HCPCS | Performed by: NURSE PRACTITIONER

## 2021-11-24 PROCEDURE — 99213 OFFICE O/P EST LOW 20 MIN: CPT | Performed by: NURSE PRACTITIONER

## 2021-11-24 ASSESSMENT — ENCOUNTER SYMPTOMS
SHORTNESS OF BREATH: 1
NAUSEA: 1
SINUS PRESSURE: 0
COUGH: 1
VOMITING: 0
RHINORRHEA: 0
DIARRHEA: 0
SORE THROAT: 0
ABDOMINAL PAIN: 0
CHEST TIGHTNESS: 1

## 2021-11-24 NOTE — PROGRESS NOTES
TELEHEALTH EVALUATION -- Audio/Visual (During [facility]- public health emergency)    -   Dorita Ty is a 55 y.o. male being evaluated by a Virtual Visit (video visit) encounter to address concerns as mentioned above. A caregiver was present when appropriate. Due to this being a TeleHealth encounter (During [facility]OFG[facility]- public health emergency), evaluation of the following organ systems was limited: Vitals/Constitutional/EENT/Resp/CV/GI//MS/Neuro/Skin/Heme-Lymph-Imm. Pursuant to the emergency declaration under the [address] [place] [address], [address] [place] [address] authority and the [employer] Act, this Virtual Visit was conducted with patient's (and/or legal guardian's) consent, to reduce the patient's risk of exposure to COVID-19 and provide necessary medical care. The patient (and/or legal guardian) has also been advised to contact this office for worsening conditions or problems, and seek emergency medical treatment and/or call 911 if deemed necessary. Patient was contacted and agreed to proceed with a virtual visit via Telephone Visit  The risks and benefits of converting to a virtual visit were discussed in light of the current infectious disease epidemic. Patient also understood that insurance coverage and co-pays are up to their individual insurance plans. Patient was located at their home. Provider was located at their office. 2021  Dorita Ty (:  1965) has requested an audio/video evaluation for the following concern(s):    HPI  VV audio for viral testing   #2 COVID-19 vaccine in Feb. Did have COVID-19 last year at this time   Started to feel unwell around 10 days ago   Nasal drainage/congestion, cough, headache, chills & body aches   Clear to yellow phlegm  Chest tightness & SOB   Fatigued   Nausea w/o emesis   Afebrile.  Having chills   Nausea w/o emesis   No appetite  Hydrating somewhat today   Sleep interrupted               Review of Systems   Constitutional: Positive for appetite change, chills and fatigue. Negative for activity change, diaphoresis and fever. HENT: Positive for congestion. Negative for ear pain, rhinorrhea, sinus pressure and sore throat. Respiratory: Positive for cough, chest tightness and shortness of breath. Cardiovascular: Negative for chest pain and palpitations. Gastrointestinal: Positive for nausea. Negative for abdominal pain, diarrhea and vomiting. Musculoskeletal: Negative for myalgias. Skin: Negative for rash. Neurological: Positive for headaches (yesterday ). Negative for dizziness and light-headedness. Psychiatric/Behavioral: Negative for sleep disturbance. Prior to Visit Medications    Medication Sig Taking?  Authorizing Provider   polyethylene glycol (GLYCOLAX) 17 GM/SCOOP powder Take 17 g by mouth daily as needed (constipation) Yes ALLA Corrales NP   rosuvastatin (CRESTOR) 5 MG tablet TAKE 1 TABLET DAILY Yes Keyla Frederick MD   olmesartan-hydroCHLOROthiazide (BENICAR HCT) 40-12.5 MG per tablet Take 1 tablet by mouth every day Yes Keyla Frederick MD       Past Medical History:   Diagnosis Date    Celiac disease     Hypercholesterolemia     Hypertension      Past Surgical History:   Procedure Laterality Date    COLONOSCOPY  12/19/11    normal    HERNIA REPAIR Left 5/8/15    repair of ventral hernia with mesh and left inguinal hernia repair Dr Shyam Bernardo Right     TONSILLECTOMY       Social History     Socioeconomic History    Marital status:      Spouse name: Not on file    Number of children: Not on file    Years of education: Not on file    Highest education level: Not on file   Occupational History    Not on file   Tobacco Use    Smoking status: Never Smoker    Smokeless tobacco: Never Used   Substance and Sexual Activity    Alcohol use: No    Drug use: No    Sexual activity: Not on file Other Topics Concern    Not on file   Social History Narrative    Not on file     Social Determinants of Health     Financial Resource Strain: Low Risk     Difficulty of Paying Living Expenses: Not hard at all   Food Insecurity: No Food Insecurity    Worried About Running Out of Food in the Last Year: Never true    [address] in the Last Year: Never true   Transportation Needs:     Lack of Transportation (Medical): Not on file    Lack of Transportation (Non-Medical): Not on file   Physical Activity:     Days of Exercise per Week: Not on file    Minutes of Exercise per Session: Not on file   Stress:     Feeling of Stress : Not on file   Social Connections:     Frequency of Communication with Friends and Family: Not on file    Frequency of Social Gatherings with Friends and Family: Not on file    Attends Congregation Services: Not on file    Active Member of [employer] or Organizations: Not on file    Attends Club or Organization Meetings: Not on file    Marital Status: Not on file   Intimate Partner Violence:     Fear of Current or Ex-Partner: Not on file    Emotionally Abused: Not on file    Physically Abused: Not on file    Sexually Abused: Not on file   Housing Stability:     Unable to Pay for Housing in the Last Year: Not on file    Number of [place] in the Last Year: Not on file    Unstable Housing in the Last Year: Not on file     Family History   Problem Relation Age of Onset    High Blood Pressure Sister     High Blood Pressure Mother      Allergies   Allergen Reactions    Buspar [Buspirone Hcl] Other (See Comments)     Chills and decreased appetite.  Lopressor [Metoprolol]      Light headed       PMH, Surgical Hx, Family Hx, and Social Hx reviewed and updated.         PHYSICAL EXAMINATION:   Oriented and conversant   No audible distress   No cough throughout visit           Other pertinent observable physical exam findings-   Results for orders placed or performed in visit on 11/24/21   POCT COVID-19, Antigen   Result Value Ref Range    SARS-COV-2, POC Not-Detected Not Detected    Lot Number [id number]     QC Pass/Fail P     Performing Instrument BD Veritor    POCT Influenza A/B   Result Value Ref Range    Influenza A Ab NEG     Influenza B Ab NEG        ASSESSMENT/PLAN:  Assessment & Plan   Randie Harada was seen today for generalized body aches, nausea, congestion, cough and other. Diagnoses and all orders for this visit:    Viral illness    Cough  -     POCT COVID-19, Antigen  -     Covid-19 Ambulatory; Future  -     POCT Influenza A/B    Body aches    Encounter for screening for viral disease  -     POCT COVID-19, Antigen  -     Covid-19 Ambulatory; Future  -     POCT Influenza A/B      Orders Placed This Encounter   Procedures    Covid-19 Ambulatory     Standing Status:   Future     Number of Occurrences:   1     Standing Expiration Date:   11/24/2022     Scheduling Instructions:      Saline media preferred given current shortage of viral transport media but both acceptable     Order Specific Question:   Is this test for diagnosis or screening? Answer:   Diagnosis of ill patient     Order Specific Question:   Symptomatic for COVID-19 as defined by CDC? Answer:   Yes     Order Specific Question:   Date of Symptom Onset     Answer:   11/17/2021     Order Specific Question:   Hospitalized for COVID-19? Answer:   No     Order Specific Question:   Admitted to ICU for COVID-19? Answer:   No     Order Specific Question:   Employed in healthcare setting? Answer:   No     Order Specific Question:   Resident in a congregate (group) care setting? Answer:   No     Order Specific Question:   Pregnant: Answer:   No     Order Specific Question:   Previously tested for COVID-19? Answer: Yes    POCT COVID-19, Antigen     Order Specific Question:   Is this test for diagnosis or screening?      Answer:   Diagnosis of ill patient     Order Specific Question:   Symptomatic

## 2021-11-28 LAB
SARS-COV-2: NOT DETECTED
SOURCE: NORMAL

## 2021-11-29 ENCOUNTER — OFFICE VISIT (OUTPATIENT)
Dept: FAMILY MEDICINE CLINIC | Age: 56
End: 2021-11-29
Payer: COMMERCIAL

## 2021-11-29 VITALS
OXYGEN SATURATION: 98 % | WEIGHT: 210 LBS | SYSTOLIC BLOOD PRESSURE: 134 MMHG | HEIGHT: 76 IN | HEART RATE: 75 BPM | BODY MASS INDEX: 25.57 KG/M2 | DIASTOLIC BLOOD PRESSURE: 82 MMHG

## 2021-11-29 DIAGNOSIS — E78.00 HYPERCHOLESTEROLEMIA: ICD-10-CM

## 2021-11-29 DIAGNOSIS — R19.4 BOWEL HABIT CHANGES: ICD-10-CM

## 2021-11-29 DIAGNOSIS — Z12.11 COLON CANCER SCREENING: ICD-10-CM

## 2021-11-29 DIAGNOSIS — Z23 NEED FOR INFLUENZA VACCINATION: ICD-10-CM

## 2021-11-29 DIAGNOSIS — I10 ESSENTIAL HYPERTENSION: Primary | ICD-10-CM

## 2021-11-29 PROCEDURE — 90471 IMMUNIZATION ADMIN: CPT | Performed by: NURSE PRACTITIONER

## 2021-11-29 PROCEDURE — G8427 DOCREV CUR MEDS BY ELIG CLIN: HCPCS | Performed by: NURSE PRACTITIONER

## 2021-11-29 PROCEDURE — 1036F TOBACCO NON-USER: CPT | Performed by: NURSE PRACTITIONER

## 2021-11-29 PROCEDURE — 90674 CCIIV4 VAC NO PRSV 0.5 ML IM: CPT | Performed by: NURSE PRACTITIONER

## 2021-11-29 PROCEDURE — 99214 OFFICE O/P EST MOD 30 MIN: CPT | Performed by: NURSE PRACTITIONER

## 2021-11-29 PROCEDURE — G8419 CALC BMI OUT NRM PARAM NOF/U: HCPCS | Performed by: NURSE PRACTITIONER

## 2021-11-29 PROCEDURE — G8482 FLU IMMUNIZE ORDER/ADMIN: HCPCS | Performed by: NURSE PRACTITIONER

## 2021-11-29 PROCEDURE — 3017F COLORECTAL CA SCREEN DOC REV: CPT | Performed by: NURSE PRACTITIONER

## 2021-11-29 ASSESSMENT — ENCOUNTER SYMPTOMS
CHEST TIGHTNESS: 0
SHORTNESS OF BREATH: 0
CONSTIPATION: 1
EYES NEGATIVE: 1
RHINORRHEA: 1

## 2021-11-29 NOTE — PROGRESS NOTES
Subjective  Chief Complaint   Patient presents with    Annual Exam       HPI     Had episode of urinary frequency earlier in the month. Seems to be doing better. Not sure of cause. Also had URI over past few weeks. That has resolved as well.   doing much better. Took advil and BP wentp. Started coricidin. Seems to be helping. Feels good today     Hyperlipidemia. Taking medication as prescribed    HTN- seems borderline. No symptoms. Taking medication    Admits to not watching diet as much recently.    Has gained 10lbs since last year  Denies using salt       Patient Active Problem List    Diagnosis Date Noted    Back abscess 07/17/2020    Seasonal allergies 05/16/2016    Tachycardia 04/17/2015    Left inguinal hernia 16/41/5367    Umbilical hernia 13/68/7753    Celiac disease 03/09/2015    Breast lump on right side at 6 o'clock position 10/23/2014    Hypertension     Hypercholesterolemia      Past Medical History:   Diagnosis Date    Celiac disease     Hypercholesterolemia     Hypertension      Past Surgical History:   Procedure Laterality Date    COLONOSCOPY  12/19/11    normal    HERNIA REPAIR Left 5/8/15    repair of ventral hernia with mesh and left inguinal hernia repair Dr Jaclyn Jones Right     TONSILLECTOMY       Family History   Problem Relation Age of Onset    High Blood Pressure Sister     High Blood Pressure Mother      Social History     Socioeconomic History    Marital status:      Spouse name: None    Number of children: None    Years of education: None    Highest education level: None   Occupational History    None   Tobacco Use    Smoking status: Never Smoker    Smokeless tobacco: Never Used   Substance and Sexual Activity    Alcohol use: No    Drug use: No    Sexual activity: None   Other Topics Concern    None   Social History Narrative    None     Social Determinants of Health     Financial Resource Strain: Low Risk     Difficulty of Paying Living Expenses: Not hard at all   Food Insecurity: No Food Insecurity    Worried About Running Out of Food in the Last Year: Never true    Ran Out of Food in the Last Year: Never true   Transportation Needs:     Lack of Transportation (Medical): Not on file    Lack of Transportation (Non-Medical): Not on file   Physical Activity:     Days of Exercise per Week: Not on file    Minutes of Exercise per Session: Not on file   Stress:     Feeling of Stress : Not on file   Social Connections:     Frequency of Communication with Friends and Family: Not on file    Frequency of Social Gatherings with Friends and Family: Not on file    Attends Restorationism Services: Not on file    Active Member of [address] [place] [address] [employer] or Organizations: Not on file    Attends Club or Organization Meetings: Not on file    Marital Status: Not on file   Intimate Partner Violence:     Fear of Current or Ex-Partner: Not on file    Emotionally Abused: Not on file    Physically Abused: Not on file    Sexually Abused: Not on file   Housing Stability:     Unable to Pay for Housing in the Last Year: Not on file    Number of [place] in the Last Year: Not on file    Unstable Housing in the Last Year: Not on file     Current Outpatient Medications on File Prior to Visit   Medication Sig Dispense Refill    rosuvastatin (CRESTOR) 5 MG tablet TAKE 1 TABLET DAILY 90 tablet 1    olmesartan-hydroCHLOROthiazide (BENICAR HCT) 40-12.5 MG per tablet Take 1 tablet by mouth every day 90 tablet 1    polyethylene glycol (GLYCOLAX) 17 GM/SCOOP powder Take 17 g by mouth daily as needed (constipation) (Patient not taking: Reported on 11/29/2021) 510 g 0     No current facility-administered medications on file prior to visit. Allergies   Allergen Reactions    Buspar [Buspirone Hcl] Other (See Comments)     Chills and decreased appetite.     Lopressor [Metoprolol]      Light headed       Review of Systems   Constitutional: Negative for chills and fever. HENT: Positive for congestion and rhinorrhea. Eyes: Negative. Respiratory: Negative for chest tightness and shortness of breath. Cardiovascular: Negative for chest pain, palpitations and leg swelling. Gastrointestinal: Positive for constipation. Endocrine: Negative. Genitourinary: Negative for difficulty urinating and urgency. Musculoskeletal: Negative. Skin: Negative. Neurological: Negative. Hematological: Negative. Psychiatric/Behavioral: Negative. Objective  Vitals:    11/29/21 1114   BP: 134/82   Pulse: 75   SpO2: 98%   Weight: 210 lb (95.3 kg)   Height: 6' 4\" (1.93 m)     Physical Exam  Vitals and nursing note reviewed. Exam conducted with a chaperone present. Constitutional:       Appearance: Normal appearance. He is normal weight. HENT:      Head: Normocephalic. Cardiovascular:      Rate and Rhythm: Normal rate and regular rhythm. Heart sounds: Normal heart sounds. Pulmonary:      Effort: Pulmonary effort is normal.      Breath sounds: Normal breath sounds. Musculoskeletal:      Right lower leg: No edema. Left lower leg: No edema. Skin:     General: Skin is warm and dry. Neurological:      General: No focal deficit present. Mental Status: He is alert and oriented to person, place, and time. Mental status is at baseline. Psychiatric:         Mood and Affect: Mood normal.         Behavior: Behavior normal.         Thought Content: Thought content normal.       Assessment & Plan     Diagnosis Orders   1. Essential hypertension     2. Bowel habit changes  TSH with Reflex   3. Need for influenza vaccination  INFLUENZA, MDCK QUADV, 2 YRS AND OLDER, IM, PF, PREFILL SYR OR SDV, 0.5ML (FLUCELVAX QUADV, PF)   4. Colon cancer screening  Cologuard   5. Hypercholesterolemia         Orders Placed This Encounter   Procedures    Cologuard     This test is performed by an external laboratory and is used for result attachment only.   It is required that this order requisition be faxed to: [facility] @@ [phone]. See [email or web address] for further information. Standing Status:   Future     Standing Expiration Date:   11/29/2022    INFLUENZA, MDCK QUADV, 2 YRS AND OLDER, IM, PF, PREFILL SYR OR SDV, 0.5ML (FLUCELVAX QUADV, PF)    TSH with Reflex     Standing Status:   Future     Standing Expiration Date:   11/29/2022       Pt instructed to monitor BP at home. If systolic consistently in 725'G or diastolic mid 17'E, pt to call. Pt verbalizes understanding. Side effects, adverse effects of the medication prescribed today, as well as treatment plan/ rationale and result expectations have been discussed with the patient who expresses understanding and desires to proceed. Close follow up to evaluate treatment results and for coordination of care. I have reviewed the patient's medical history in detail and updated the computerized patient record. As always, patient is advised that if symptoms worsen in any way they will proceed to the nearest emergency room.      Dinorah Mercedes, ALLA - CNP

## 2021-12-07 DIAGNOSIS — E78.00 HYPERCHOLESTEROLEMIA: ICD-10-CM

## 2021-12-07 DIAGNOSIS — Z12.11 COLON CANCER SCREENING: ICD-10-CM

## 2021-12-07 DIAGNOSIS — Z12.5 SCREENING PSA (PROSTATE SPECIFIC ANTIGEN): ICD-10-CM

## 2021-12-07 DIAGNOSIS — R19.4 BOWEL HABIT CHANGES: ICD-10-CM

## 2021-12-07 DIAGNOSIS — I10 ESSENTIAL HYPERTENSION: ICD-10-CM

## 2021-12-07 LAB
ALBUMIN SERPL-MCNC: 4.7 G/DL (ref 3.5–4.6)
ALP BLD-CCNC: 58 U/L (ref 35–104)
ALT SERPL-CCNC: 18 U/L (ref 0–41)
ANION GAP SERPL CALCULATED.3IONS-SCNC: 12 MEQ/L (ref 9–15)
AST SERPL-CCNC: 18 U/L (ref 0–40)
BILIRUB SERPL-MCNC: 0.7 MG/DL (ref 0.2–0.7)
BUN BLDV-MCNC: 18 MG/DL (ref 6–20)
CALCIUM SERPL-MCNC: 8.9 MG/DL (ref 8.5–9.9)
CHLORIDE BLD-SCNC: 106 MEQ/L (ref 95–107)
CHOLESTEROL, TOTAL: 147 MG/DL (ref 0–199)
CO2: 25 MEQ/L (ref 20–31)
CREAT SERPL-MCNC: 0.8 MG/DL (ref 0.7–1.2)
GFR AFRICAN AMERICAN: >60
GFR NON-AFRICAN AMERICAN: >60
GLOBULIN: 2.6 G/DL (ref 2.3–3.5)
GLUCOSE BLD-MCNC: 97 MG/DL (ref 70–99)
HDLC SERPL-MCNC: 58 MG/DL (ref 40–59)
LDL CHOLESTEROL CALCULATED: 78 MG/DL (ref 0–129)
POTASSIUM SERPL-SCNC: 3.9 MEQ/L (ref 3.4–4.9)
PROSTATE SPECIFIC ANTIGEN: 3.39 NG/ML (ref 0–4)
SODIUM BLD-SCNC: 143 MEQ/L (ref 135–144)
TOTAL PROTEIN: 7.3 G/DL (ref 6.3–8)
TRIGL SERPL-MCNC: 56 MG/DL (ref 0–150)
TSH REFLEX: 0.95 UIU/ML (ref 0.44–3.86)

## 2022-02-07 DIAGNOSIS — Z12.11 COLON CANCER SCREENING: Primary | ICD-10-CM

## 2022-06-01 ENCOUNTER — OFFICE VISIT (OUTPATIENT)
Dept: FAMILY MEDICINE CLINIC | Age: 57
End: 2022-06-01
Payer: COMMERCIAL

## 2022-06-01 VITALS
SYSTOLIC BLOOD PRESSURE: 126 MMHG | HEART RATE: 79 BPM | HEIGHT: 76 IN | DIASTOLIC BLOOD PRESSURE: 72 MMHG | WEIGHT: 200 LBS | OXYGEN SATURATION: 98 % | BODY MASS INDEX: 24.36 KG/M2

## 2022-06-01 DIAGNOSIS — I10 ESSENTIAL HYPERTENSION: ICD-10-CM

## 2022-06-01 DIAGNOSIS — F41.9 ANXIETY: ICD-10-CM

## 2022-06-01 DIAGNOSIS — R97.20 ELEVATED PSA: ICD-10-CM

## 2022-06-01 DIAGNOSIS — F41.9 ANXIETY: Primary | ICD-10-CM

## 2022-06-01 DIAGNOSIS — E78.00 HYPERCHOLESTEROLEMIA: ICD-10-CM

## 2022-06-01 LAB
PROSTATE SPECIFIC ANTIGEN: 2.24 NG/ML (ref 0–4)
TSH REFLEX: 1.09 UIU/ML (ref 0.44–3.86)

## 2022-06-01 PROCEDURE — 99214 OFFICE O/P EST MOD 30 MIN: CPT | Performed by: NURSE PRACTITIONER

## 2022-06-01 RX ORDER — HYDROXYZINE HYDROCHLORIDE 25 MG/1
25 TABLET, FILM COATED ORAL EVERY 8 HOURS PRN
Qty: 30 TABLET | Refills: 0 | Status: SHIPPED | OUTPATIENT
Start: 2022-06-01 | End: 2022-06-11

## 2022-06-01 RX ORDER — ROSUVASTATIN CALCIUM 5 MG/1
TABLET, COATED ORAL
Qty: 90 TABLET | Refills: 1 | Status: SHIPPED | OUTPATIENT
Start: 2022-06-01 | End: 2022-08-17 | Stop reason: SDUPTHER

## 2022-06-01 RX ORDER — OLMESARTAN MEDOXOMIL AND HYDROCHLOROTHIAZIDE 40/12.5 40; 12.5 MG/1; MG/1
TABLET ORAL
Qty: 90 TABLET | Refills: 1 | Status: SHIPPED | OUTPATIENT
Start: 2022-06-01 | End: 2022-10-18 | Stop reason: SDUPTHER

## 2022-06-01 RX ORDER — AMLODIPINE BESYLATE 5 MG/1
TABLET ORAL
Qty: 90 TABLET | Refills: 1 | Status: SHIPPED | OUTPATIENT
Start: 2022-06-01 | End: 2022-10-18 | Stop reason: SDUPTHER

## 2022-06-01 ASSESSMENT — PATIENT HEALTH QUESTIONNAIRE - PHQ9
SUM OF ALL RESPONSES TO PHQ QUESTIONS 1-9: 0
SUM OF ALL RESPONSES TO PHQ9 QUESTIONS 1 & 2: 0
1. LITTLE INTEREST OR PLEASURE IN DOING THINGS: 0
2. FEELING DOWN, DEPRESSED OR HOPELESS: 0
SUM OF ALL RESPONSES TO PHQ QUESTIONS 1-9: 0

## 2022-06-01 ASSESSMENT — ENCOUNTER SYMPTOMS
COUGH: 0
SHORTNESS OF BREATH: 1

## 2022-06-01 NOTE — PROGRESS NOTES
Subjective  Chief Complaint   Patient presents with    Hypertension     6 month f/u       HPI     Has been more anxious recently. States that he has had what feels like panic attacks- tingling, nervousness  Bp high during these attacks. Feels like it may be related to his FDC. Has hx of anxiety awhile back. Had tried buspar at one point. Trying \"baba anxiety support\"  Family hx of anxiety. Urinary symptoms when bladder is full. Hesitancy. PSA was up a bit last bw. HTN- has been slightly high intermittently recently. Taking all medications as prescribed.       Patient Active Problem List    Diagnosis Date Noted    Back abscess 07/17/2020    Seasonal allergies 05/16/2016    Tachycardia 04/17/2015    Left inguinal hernia 59/19/6501    Umbilical hernia 78/66/5858    Celiac disease 03/09/2015    Breast lump on right side at 6 o'clock position 10/23/2014    Hypertension     Hypercholesterolemia      Past Medical History:   Diagnosis Date    Celiac disease     Hypercholesterolemia     Hypertension      Past Surgical History:   Procedure Laterality Date    COLONOSCOPY  12/19/11    normal    HERNIA REPAIR Left 5/8/15    repair of ventral hernia with mesh and left inguinal hernia repair Dr Jennifer Mcdaniel Right     TONSILLECTOMY       Family History   Problem Relation Age of Onset    High Blood Pressure Sister     High Blood Pressure Mother      Social History     Socioeconomic History    Marital status:      Spouse name: None    Number of children: None    Years of education: None    Highest education level: None   Occupational History    None   Tobacco Use    Smoking status: Never Smoker    Smokeless tobacco: Never Used   Substance and Sexual Activity    Alcohol use: No    Drug use: No    Sexual activity: None   Other Topics Concern    None   Social History Narrative    None     Social Determinants of Health     Financial Resource Strain: Low Risk     Difficulty of Paying Living Expenses: Not hard at all   Food Insecurity: No Food Insecurity    Worried About Running Out of Food in the Last Year: Never true    Maura of Food in the Last Year: Never true   Transportation Needs:     Lack of Transportation (Medical): Not on file    Lack of Transportation (Non-Medical): Not on file   Physical Activity:     Days of Exercise per Week: Not on file    Minutes of Exercise per Session: Not on file   Stress:     Feeling of Stress : Not on file   Social Connections:     Frequency of Communication with Friends and Family: Not on file    Frequency of Social Gatherings with Friends and Family: Not on file    Attends Anabaptist Services: Not on file    Active Member of [address] [place] [address] [employer] or Organizations: Not on file    Attends Club or Organization Meetings: Not on file    Marital Status: Not on file   Intimate Partner Violence:     Fear of Current or Ex-Partner: Not on file    Emotionally Abused: Not on file    Physically Abused: Not on file    Sexually Abused: Not on file   Housing Stability:     Unable to Pay for Housing in the Last Year: Not on file    Number of [place] in the Last Year: Not on file    Unstable Housing in the Last Year: Not on file     No current outpatient medications on file prior to visit. No current facility-administered medications on file prior to visit. Allergies   Allergen Reactions    Buspar [Buspirone Hcl] Other (See Comments)     Chills and decreased appetite.  Lopressor [Metoprolol]      Light headed       Review of Systems   Constitutional: Negative for fatigue. Respiratory: Positive for shortness of breath. Negative for cough. Cardiovascular: Negative for chest pain. Psychiatric/Behavioral: The patient is nervous/anxious. Objective  Vitals:    06/01/22 0800   BP: 126/72   Pulse: 79   SpO2: 98%   Weight: 200 lb (90.7 kg)   Height: 6' 4\" (1.93 m)     Physical Exam  Vitals and nursing note reviewed. Constitutional:       Appearance: Normal appearance. HENT:      Head: Normocephalic. Nose: Nose normal.      Mouth/Throat:      Mouth: Mucous membranes are moist.      Pharynx: Oropharynx is clear. Eyes:      Extraocular Movements: Extraocular movements intact. Conjunctiva/sclera: Conjunctivae normal.      Pupils: Pupils are equal, round, and reactive to light. Cardiovascular:      Rate and Rhythm: Normal rate and regular rhythm. Pulses: Normal pulses. Heart sounds: Normal heart sounds. No murmur heard. Pulmonary:      Effort: Pulmonary effort is normal.      Breath sounds: Normal breath sounds. Musculoskeletal:      Cervical back: Neck supple. Neurological:      General: No focal deficit present. Mental Status: He is alert and oriented to person, place, and time. Mental status is at baseline. Psychiatric:         Attention and Perception: Attention normal.         Mood and Affect: Mood is anxious. Speech: Speech normal.         Behavior: Behavior is hyperactive. Thought Content: Thought content normal.         Cognition and Memory: Cognition normal.           Assessment & Plan     Diagnosis Orders   1. Anxiety  hydrOXYzine (ATARAX) 25 MG tablet    TSH with Reflex   2. Essential hypertension  olmesartan-hydroCHLOROthiazide (BENICAR HCT) 40-12.5 MG per tablet    amLODIPine (NORVASC) 5 MG tablet   3. Hypercholesterolemia  rosuvastatin (CRESTOR) 5 MG tablet   4.  Elevated PSA  PSA, Diagnostic       Orders Placed This Encounter   Procedures    PSA, Diagnostic     Standing Status:   Future     Standing Expiration Date:   6/1/2023    TSH with Reflex     Standing Status:   Future     Standing Expiration Date:   6/1/2023       Orders Placed This Encounter   Medications    olmesartan-hydroCHLOROthiazide (BENICAR HCT) 40-12.5 MG per tablet     Sig: Take 1 tablet by mouth every day     Dispense:  90 tablet     Refill:  1     Refill [id number]    rosuvastatin (CRESTOR) 5 MG tablet     Sig: TAKE 1 TABLET DAILY     Dispense:  90 tablet     Refill:  1    hydrOXYzine (ATARAX) 25 MG tablet     Sig: Take 1 tablet by mouth every 8 hours as needed for Anxiety     Dispense:  30 tablet     Refill:  0    amLODIPine (NORVASC) 5 MG tablet     Sig: TAKE 1 TABLET daily     Dispense:  90 tablet     Refill:  1       Side effects, adverse effects of the medication prescribed today, as well as treatment plan/ rationale and result expectations have been discussed with the patient who expresses understanding and desires to proceed. Close follow up to evaluate treatment results and for coordination of care. I have reviewed the patient's medical history in detail and updated the computerized patient record. As always, patient is advised that if symptoms worsen in any way they will proceed to the nearest emergency room.      FU in a couple of mos to recheck anxiety      ALLA Funez - CNP

## 2022-08-17 ENCOUNTER — OFFICE VISIT (OUTPATIENT)
Dept: FAMILY MEDICINE CLINIC | Age: 57
End: 2022-08-17
Payer: COMMERCIAL

## 2022-08-17 VITALS
BODY MASS INDEX: 24.11 KG/M2 | WEIGHT: 198 LBS | HEART RATE: 73 BPM | HEIGHT: 76 IN | SYSTOLIC BLOOD PRESSURE: 122 MMHG | OXYGEN SATURATION: 99 % | DIASTOLIC BLOOD PRESSURE: 80 MMHG

## 2022-08-17 DIAGNOSIS — I10 ESSENTIAL HYPERTENSION: ICD-10-CM

## 2022-08-17 DIAGNOSIS — F41.9 ANXIETY: Primary | ICD-10-CM

## 2022-08-17 DIAGNOSIS — E78.00 HYPERCHOLESTEROLEMIA: ICD-10-CM

## 2022-08-17 PROCEDURE — 99213 OFFICE O/P EST LOW 20 MIN: CPT | Performed by: NURSE PRACTITIONER

## 2022-08-17 RX ORDER — ROSUVASTATIN CALCIUM 5 MG/1
TABLET, COATED ORAL
Qty: 90 TABLET | Refills: 1 | Status: SHIPPED | OUTPATIENT
Start: 2022-08-17

## 2022-08-17 ASSESSMENT — ENCOUNTER SYMPTOMS
CONSTIPATION: 0
COUGH: 0
SHORTNESS OF BREATH: 0
DIARRHEA: 0

## 2022-08-17 NOTE — PROGRESS NOTES
Subjective  Chief Complaint   Patient presents with    Hypertension       HPI    Has been anxious but somewhat better recently. States that it is mostly with change in routine. Has not taking hydroxyzine. Seemed to be worse with traveling. Two times a week seems to be an issue. Has not been on the crestor due to cost.   Would like to try again. Has been taking Bp pills daily. Seems to be running well. Trying to exercise and eating better.      Patient Active Problem List    Diagnosis Date Noted    Back abscess 07/17/2020    Seasonal allergies 05/16/2016    Tachycardia 04/17/2015    Left inguinal hernia 13/39/6925    Umbilical hernia 60/07/1293    Celiac disease 03/09/2015    Breast lump on right side at 6 o'clock position 10/23/2014    Hypertension     Hypercholesterolemia      Past Medical History:   Diagnosis Date    Celiac disease     Hypercholesterolemia     Hypertension      Past Surgical History:   Procedure Laterality Date    COLONOSCOPY  12/19/11    normal    HERNIA REPAIR Left 5/8/15    repair of ventral hernia with mesh and left inguinal hernia repair Dr Marcelina Vann Right     TONSILLECTOMY       Family History   Problem Relation Age of Onset    High Blood Pressure Sister     High Blood Pressure Mother      Social History     Socioeconomic History    Marital status:      Spouse name: None    Number of children: None    Years of education: None    Highest education level: None   Tobacco Use    Smoking status: Never    Smokeless tobacco: Never   Substance and Sexual Activity    Alcohol use: No    Drug use: No     Social Determinants of Health     Financial Resource Strain: Low Risk     Difficulty of Paying Living Expenses: Not hard at all   Food Insecurity: No Food Insecurity    Worried About Running Out of Food in the Last Year: Never true    Ran Out of Food in the Last Year: Never true     Current Outpatient Medications on File Prior to Visit   Medication Sig Judgment normal.       Assessment & Plan     Diagnosis Orders   1. Anxiety  Ambulatory referral to Psychology      2. Hypercholesterolemia  rosuvastatin (CRESTOR) 5 MG tablet      3. Essential hypertension  Well controlled          Orders Placed This Encounter   Procedures    Ambulatory referral to Psychology     Referral Priority:   Routine     Referral Type:   Psychiatric     Referral Reason:   Specialty Services Required     Referred to Provider:   Ede Araujo PSYD     Requested Specialty:   Psychology     Number of Visits Requested:   1       Orders Placed This Encounter   Medications    rosuvastatin (CRESTOR) 5 MG tablet     Sig: TAKE 1 TABLET DAILY     Dispense:  90 tablet     Refill:  1       Medications Discontinued During This Encounter   Medication Reason    rosuvastatin (CRESTOR) 5 MG tablet REORDER        Return in about 6 months (around 2/17/2023). Side effects, adverse effects of the medication prescribed today, as well as treatment plan/ rationale and result expectations have been discussed with the patient who expresses understanding and desires to proceed. Close follow up to evaluate treatment results and for coordination of care. I have reviewed the patient's medical history in detail and updated the computerized patient record. As always, patient is advised that if symptoms worsen in any way they will proceed to the nearest emergency room.           Fabiana Dao, APRN - CNP

## 2022-08-30 ENCOUNTER — OFFICE VISIT (OUTPATIENT)
Dept: BEHAVIORAL/MENTAL HEALTH CLINIC | Age: 57
End: 2022-08-30
Payer: COMMERCIAL

## 2022-08-30 DIAGNOSIS — F41.1 GAD (GENERALIZED ANXIETY DISORDER): Primary | ICD-10-CM

## 2022-08-30 PROCEDURE — 90791 PSYCH DIAGNOSTIC EVALUATION: CPT | Performed by: PSYCHOLOGIST

## 2022-08-30 ASSESSMENT — PATIENT HEALTH QUESTIONNAIRE - PHQ9
9. THOUGHTS THAT YOU WOULD BE BETTER OFF DEAD, OR OF HURTING YOURSELF: 0
2. FEELING DOWN, DEPRESSED OR HOPELESS: 0
10. IF YOU CHECKED OFF ANY PROBLEMS, HOW DIFFICULT HAVE THESE PROBLEMS MADE IT FOR YOU TO DO YOUR WORK, TAKE CARE OF THINGS AT HOME, OR GET ALONG WITH OTHER PEOPLE: 0
6. FEELING BAD ABOUT YOURSELF - OR THAT YOU ARE A FAILURE OR HAVE LET YOURSELF OR YOUR FAMILY DOWN: 1
5. POOR APPETITE OR OVEREATING: 1
SUM OF ALL RESPONSES TO PHQ9 QUESTIONS 1 & 2: 0
SUM OF ALL RESPONSES TO PHQ QUESTIONS 1-9: 2
3. TROUBLE FALLING OR STAYING ASLEEP: 0
SUM OF ALL RESPONSES TO PHQ QUESTIONS 1-9: 2
7. TROUBLE CONCENTRATING ON THINGS, SUCH AS READING THE NEWSPAPER OR WATCHING TELEVISION: 0
1. LITTLE INTEREST OR PLEASURE IN DOING THINGS: 0
SUM OF ALL RESPONSES TO PHQ QUESTIONS 1-9: 2
SUM OF ALL RESPONSES TO PHQ QUESTIONS 1-9: 2
8. MOVING OR SPEAKING SO SLOWLY THAT OTHER PEOPLE COULD HAVE NOTICED. OR THE OPPOSITE, BEING SO FIGETY OR RESTLESS THAT YOU HAVE BEEN MOVING AROUND A LOT MORE THAN USUAL: 0
4. FEELING TIRED OR HAVING LITTLE ENERGY: 0

## 2022-08-30 ASSESSMENT — ANXIETY QUESTIONNAIRES
5. BEING SO RESTLESS THAT IT IS HARD TO SIT STILL: 1
GAD7 TOTAL SCORE: 12
7. FEELING AFRAID AS IF SOMETHING AWFUL MIGHT HAPPEN: 2
4. TROUBLE RELAXING: 2
2. NOT BEING ABLE TO STOP OR CONTROL WORRYING: 2
3. WORRYING TOO MUCH ABOUT DIFFERENT THINGS: 2
IF YOU CHECKED OFF ANY PROBLEMS ON THIS QUESTIONNAIRE, HOW DIFFICULT HAVE THESE PROBLEMS MADE IT FOR YOU TO DO YOUR WORK, TAKE CARE OF THINGS AT HOME, OR GET ALONG WITH OTHER PEOPLE: SOMEWHAT DIFFICULT
1. FEELING NERVOUS, ANXIOUS, OR ON EDGE: 2
6. BECOMING EASILY ANNOYED OR IRRITABLE: 1

## 2022-08-30 NOTE — PROGRESS NOTES
Behavioral Health Consultation  Shelley Witt PsyD, [facility]  Psychologist  8/30/22  1:57 PM EDT      Time spent with Patient: 45 minutes  This is patient's first  [facility] appointment. Reason for Consult:  anxiety  Referring Provider: ALLA Loaiza CNP    Patient provided informed consent for the behavioral health program. Discussed with patient model of service to include the limits of confidentiality (i.e. abuse reporting, suicide intervention, etc.) and short-term intervention focused approach. Also discussed limits of evaluation/services as not forensic in nature, cannot be used for purposes of custody evaluations, disability determination, or to meet requirements of court-ordered treatment. Feedback given to PCP. S:  The patient was raised in [place], [place] by his biological parents. Patient has 1 younger sister. Patient denied any history of childhood abuse or trauma. The patient has completed some college coursework but did not obtain a degree. 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 Patient has been  33 years and has 1 child (daughter), age 32. The patient currently resides with his wife and daughter, with whom he reports having a good relationship. Patient denied any history of past outpatient mental health treatment. Patient denied any history of past suicide attempts and inpatient psychiatric treatment. Patient is not currently prescribed psychiatric medication. Patient described their mood as \"pretty good usually. \" The patient reported their sleep as \"fine; I don't have any problems getting to sleep and staying asleep;\" they sleep 6-7 hours per night, on average. Family history of mental health issues includes: \"My aunt has some anxiety issues and my daughter has as well, but that's it. \"    Patient denied use of alcohol. Patient does not use nicotine products. Patient denied using marijuana.  The patient denied any other substance use and misuse of prescription medication. There is no known family history of substance abuse. Presenting Problem:  Has been experienced anxiety , particularly in social situations, since childhood but, noted it was always manageable. However, in past 1-1.5 years, patient notes his anxiety has intensified significantly and tends to involve \"thinking something bad is going to happen; I get myself worked up and my arm starts tingling, and I think I'm going to have a heart attack or stroke or something. \"  Patient described his anxiety as: heart rate increases, breathing faster, shaking in hands, weakness in knees, arm tingles. Said he tries to calm self by walking, breathing, and/or going for a drive. He notes his thoughts center around: \"Is there something wrong with me, am I having a heart attack, am I going to have a stroke? \" Stated he will often take his blood pressure and check his pulse, both will be elevated and further his anxiety. Seems to be worse before/when doing something new for the first time, traveling, or before playing in a bridge tournament. Patient reported history of panic attacks, with most recent occurring approximately 6 months ago.        O:  MSE:    Appearance:  Alert, casually but appropriately dressed, well groomed  Behavior:  Anxious, Cooperative, and Pleasant  Appetite:  normal  Sleep disturbance:  No  Fatigue:  No  Loss of pleasure:  No  Impulsive behavior:  No  Speech:  spontaneous, normal rate, normal volume, and well articulated  Mood:  \"Pretty good usually\"  Affect:  Neutral/Euthymic(normal) and Anxious  Thought Process:  Goal-Directed  Thought Content:  intact and cognitive distortions  Associations:  logical connections  Insight:  fair   Judgement:  good  Orientation:  oriented to person, place, time, and general circumstances  Memory:  recent and remote memory intact  Attention/Concentration:  intact  Morbid ideation:  No  Suicide Assessment:  no suicidal ideation      History:    Medications:   Current Outpatient Medications   Medication Sig Dispense Refill    rosuvastatin (CRESTOR) 5 MG tablet TAKE 1 TABLET DAILY 90 tablet 1    olmesartan-hydroCHLOROthiazide (BENICAR HCT) 40-12.5 MG per tablet Take 1 tablet by mouth every day 90 tablet 1    amLODIPine (NORVASC) 5 MG tablet TAKE 1 TABLET daily 90 tablet 1     No current facility-administered medications for this visit. Social History:   Social History     Socioeconomic History    Marital status:      Spouse name: Not on file    Number of children: Not on file    Years of education: Not on file    Highest education level: Not on file   Occupational History    Not on file   Tobacco Use    Smoking status: Never    Smokeless tobacco: Never   Substance and Sexual Activity    Alcohol use: No    Drug use: No    Sexual activity: Not on file   Other Topics Concern    Not on file   Social History Narrative    Not on file     Social Determinants of Health     Financial Resource Strain: Low Risk     Difficulty of Paying Living Expenses: Not hard at all   Food Insecurity: No Food Insecurity    Worried About Running Out of Food in the Last Year: Never true    Ran Out of Food in the Last Year: Never true   Transportation Needs: Not on file   Physical Activity: Not on file   Stress: Not on file   Social Connections: Not on file   Intimate Partner Violence: Not on file   Housing Stability: Not on file       TOBACCO:   reports that he has never smoked. He has never used smokeless tobacco.  ETOH:   reports no history of alcohol use. Family History:   Family History   Problem Relation Age of Onset    High Blood Pressure Sister     High Blood Pressure Mother          A:  Administered the PHQ-9, which indicates a self report of minimal depression. Patient was administered the MARIA INES-7, which indicates a self report of moderate anxiety and associated symptom distress.  Patient would benefit from [facility] services to increase coping skills to provide symptom management/control/relief. PHQ Scores 8/30/2022 6/1/2022 11/12/2021 7/17/2020 11/5/2019 11/15/2018 11/15/2017   PHQ2 Score 0 0 0 0 0 0 0   PHQ9 Score 2 0 0 0 0 0 0     Interpretation of Total Score Depression Severity: 1-4 = Minimal depression, 5-9 = Mild depression, 10-14 = Moderate depression, 15-19 = Moderately severe depression, 20-27 = Severe depression      MARIA INES 7 SCORE 8/30/2022   MARIA INES-7 Total Score 12     Interpretation of MARIA INES-7 score: 5-9 = mild anxiety, 10-14 = moderate anxiety, 15+ = severe anxiety. Recommend referral to behavioral health for scores 10 or greater. Diagnosis:    1. MARIA INES (generalized anxiety disorder)        R/O Social Anxiety Disorder        Diagnosis Date    Celiac disease     Hypercholesterolemia     Hypertension            Plan:  Return in 2 weeks (on 9/13/2022). Pt interventions:  Established rapport, Conducted functional assessment, [place]-setting to identify pt's primary goals for [facility] visit / overall health, Supportive techniques, Emphasized self-care as important for managing overall health, Provided Psychoeducation re: [facility] role in primary care, fight-or-flight and relaxation responses, relaxation breathing, introduction to mindfulness, Explained relaxed breathing technique in detail and practiced this with pt in visit, Emphasized importance of regular practice of relaxation strategies to target / promote symptom management/relief, and Provided pt list of websites and several smartphone radha resources for further practicing guided meditations and breathing exercises      Pt Behavioral Change Plan:  1. Dedicate 5 minutes 3x/day to practice the deep breathing. Try to focus on making your breath out roughly twice as long as your breath in. Imagine there is a balloon just behind your navel: imagine you are inflating the balloon as you breathe in, deflating as you breathe out.     2. Review the list of apps and give some a try! (Virtual Hope Box, Mindfulness  and progressive muscle relaxation for sleep, etc.)    3. Read the below information about stress management    4. Follow up as scheduled         Please note this report has been partially produced using speech recognition software and may cause contain errors related to that system including grammar, punctuation, and spelling, as well as words and phrases that may seem inappropriate. If there are questions or concerns please feel free to contact me to clarify.

## 2022-08-30 NOTE — PATIENT INSTRUCTIONS
1. Dedicate 5 minutes 3x/day to practice the deep breathing. Try to focus on making your breath out roughly twice as long as your breath in. Imagine there is a balloon just behind your navel: imagine you are inflating the balloon as you breathe in, deflating as you breathe out. 2. Review the list of apps and give some a try! Automatic Data Box, Mindfulness  and progressive muscle relaxation for sleep, etc.)    3. Read the below information about stress management    4. Follow up as scheduled    \"The entire autonomic nervous system (and through it, our internal organs and glands) is largely driven by our breathing patterns. By changing our breathing we can influence millions of biochemical reactions in our body, producing more relaxing substances such as endorphins and fewer anxiety-producing ones like adrenaline and higher blood acidity. Mindfulness of the breath is so effective that it is common to all meditative and prayer traditions. \" Anxiety Fear & Breathing - Breathing. com    \"When overcoming high levels of anxiety, it is important to learn the techniques of correct breathing. Many people who live with high levels of anxiety are known to breathe through their chest. Shallow breathing through the chest means you are disrupting the balance of oxygen and carbon dioxide necessary to be in a relaxed state. This type of breathing will perpetuate the symptoms of anxiety. \" [email or web address] com      Diaphragmatic Breathing             _____________________________________________________________________________  1. Sit in a comfortable position    2. Place one hand on your stomach and the other on your chest    3. Try to breathe so that only your stomach rises and falls    As you inhale, concentrate on your chest remaining relatively still while your stomach rises. It may be helpful for you to imagine that your pants are too big and you need to push your stomach out to hold them up.   When exhaling, allow your stomach to fall in and the air to fully escape. Inhale slowly. You may choose to hold the air in for about a second. Exhale slowly. Dont push the air out, but just let the natural pressure of your body slowly move it out. It is normal for this healthy method of breathing to feel a little awkward at first.  With practice, it will feel more natural.    Get your mind on your side    One other important factor in getting relaxed is your mind. Your mind and body are connected. The mind influences the body and the body influences the mind. What you do with your mind when you are trying to relax is very important. The key is to avoid thinking about stressful things. You can think about      Neutral things (e.g., counting, saying a word like calm or relax)   Pleasant things (e.g., imagining a pleasant place)    It is recommended that you practice 2 times per day, 10 minutes each time. ----------------------------------------------------------------------------------------------------------------------  ----------------------------------------------------------------------------------------------------------------------  ----------------------------------------------------------------------------------------------------------------------  ----------------------------------------------------------------------------------------------------------------------      CONTROLLED or MEASURED BREATHING EXERCISE: (YOU MAY WANT TO DOWNLOAD THE FREE ALEKSANDRA \"VIRTUAL HOPE BOX\" TO PRACTICE THIS EXERCISE)    You can sit or stand, but be sure to soften up a little before you begin. Make sure your hands are relaxed, and your knees are soft. Drop your shoulders and let your jaw relax. Now breath in slowly through your nose and count to three, keep your shoulders down and allow your stomach to expand as you breathe in. Hold the breath for a moment.   Now release your breath slowly and smoothly as you count to six.  Repeat for a couple of minutes        It can be very helpful to use tools like relaxed breathing, muscle relaxation, and guided imagery/visualization to cope with stress, pain, anxiety and depression. I recommend to all my patients that they try different techniques to find the ones that work best for them. Below are 2 websites that have several breathing, relaxation, and visualization exercises that you can listen to and download for free.    NetworkAffair.[email or web address] html  · Deep Breathing & Guided Relaxation Exercises (3)  · Guided Imagery/Visualization Exercises (5)  · Mindfulness & Meditation Exercises (3)  · Progressive Muscle Relaxation   · Soothing Instrumental Music (11)    [email or web address]  · Diaphragmatic Breathing   · Deep Breathing I   · Deep Breathing II   · Progressive Muscle Relaxation   · Guided Imagery: The [employer]   · Guided Imagery: [facility]   · Relaxing Phrases   · Just This Breath   · Increasing Awareness   · Sending Thoughts Away on Clouds  · Sending Thoughts Away on Leaves  · Sorting Into Boxes     -----------------------------------------------------  Below are several apps that you can download to your smartphone to help with relaxation and mood coping. [id number]  Platform: [employer]  Cost: Free  Your breathing has a profound effect on your body. Lucien Martinez know this fact to be true if youve ever taken deep breaths to calm yourself down when you were upset. That exercise can often make you feel more centered, and its proof that breathing is powerful. The [id number] radha uses guided breathing exercises to help reduce symptoms of an anxiety attack. If an attack is coming or the symptoms are unbearable, slip away into a quiet room, open your radha, and let the worry and stress slip away with each breath.     Universal Breathing - Pranayama Free  Platform: B[employer]one & [employer]  Cost: Free  Focused breathing exercises can help you regain composure during an anxiety attack. They can also help you prevent an anxiety attack before one starts. Pranayama breathing techniques are common in yoga and have powerful benefits. If youre a beginner, you can benefit from the radhas guided breathing instruction. Clarissa Agent learn how to breathe deeply, hold, and then release with better control. If it works for you, you can purchase the full course which gives you access to the entire program.  Breathing Zone   Platform: iPhone & Android  Cost: $3.99   Breathing Zone uses a clinically proven therapeutic breathing exercise that decreases your heart rate, and with daily use can help manage high blood pressure.    ----------------------------------------------  Self-Help for Anxiety Management (RICK)  Platform: HealthEquityhone & Android  Cost: Free  The Self-Help for Anxiety Management (RICK) radha from the Onit can help you regain control of your anxiety and emotions. Tell the radha how youre feeling, how anxious you are, or how worried you are. Then let the radhas self-help features walk you through some calming or relaxation practices. If you want, you can connect with a social network of other [facility] users. Dont worry, the network isnt connected to larger networks like Twitter or [employer]. Stop Panic & Anxiety Help  Platform: Android  Cost: Free  If panic and anxiety attacks have a  on your life, this radha might help you let them go. The Stop Panic & Anxiety Help Android radha uses emotion and relaxation training audio tracks to help you fight your fears and find a state of calm. When youve overcome the attack, use the radhas journal to record what caused the attack and how you were able to get through it. Then use this journal to learn from your experiences and prepare for the future.   I Can Be Fearless by Human Progress  Platform: iPhone  Cost: Free  When you were younger, your parents might have told you that you could do anything you put your mind to. This radha might not help you be an astronaut or a world famous actress, but it can help you break through your anxiety, fears, and worries to a place of calm and confidence. Open your Apple device and select what you want to be right now -- calm, motivated, and confident are among the options -- then let the audio hypnosis guide you through a session. Anti-Anxiety   Platform: Android  Cost: Free  You can tell the radha your problems by taking a diagnostic quiz about your level of stress and anxiety. Using your answers, the radha will design a custom treatment plan for you. Follow instructional self-help videos like How to Tolerate and Lessen Anxiety.  Keep a daily journal of your anxiety and worries, and track your progress as you learn to regain a sense of calm. Worry Box - Anxiety Self-Help  Platform: Android  Cost: Free  Have you ever wished you could put all your worries in a box, leave them there, and walk away? The Worry Box radha may let you do just that. The radha functions a lot like a journal: Write down your thoughts, anxieties, and worries, and let the radha help you think them through. It will ask questions, give specific anxiety-reducing help, and can even direct you to help you reduce your worries and anxiety. It is all password protected, so you can feel safe sharing the details of your stresses. -----------------------------------------------  Relax Melodies  Platform: iPhone and Android  Cost: Free  Anxiety can disrupt healthy sleep patterns in more than one way. First, people who dont get enough sleep tend to feel more anxious. Then, people who are more anxious have a difficult time sleeping. Creating a calming environment may help you fall asleep and stay asleep. Relax to one of this radhas 50 sounds. Need the music to stop once youre asleep? Set a timer, and it will stop playing. Set an alarm when you need to be awake.  Then, enjoy the benefits of a good nights sleep, free from breathing, and the mind to reduce anxiety. This Android radha connects users with a library of relaxation videos that contain instructions for relaxing and clearing the mind. The videos are created by Dr. Hollis Hendricks, a psychologist with more than 20 years of experience. In addition to viewing Dr. Jens Ring videos, you can read a variety of articles related to anxiety, meditation, and stress management. Anxiety Free  Platform: iPzeeWAVES   Cost: Free  Meditation requires mindfulness and a sense of presence in your thoughts. Hypnosis is one step beyond meditation. It works by sending signals to your brain and transforming it almost unknowingly. The creators of this radha say that its audio recordings contain subliminal signals that speak to the subconscious with powerful effect.  Hypnosis, like meditation, requires practice, and the goal is to get each user to a place where self-hypnosis is possible in order to reduce anxiety. Relax & Rest Guided Meditations  Platform: iPzeeWAVES and [employer]  Cost: $0.99  While group meetings and discussions are always an option, some people find relaxation more easily on their own. This radha lets you relax in the space of your own home or office with three guided meditations. Breath Awareness Guided Meditation (5 minutes), Deep Rested Guided Meditation (13 minutes), and Whole Body Guided Relaxation (24 minutes) are designed specifically to help you relax and sink into a peaceful meditation moment. Equanimity - Meditation Timer & Tracker  Platform: [employer]   Cost: $4.99  Meditation is one way you can cope with the symptoms and side effects of anxiety. People who meditate can eventually train themselves to stay calm when feeling stressed or anxious. Equanimity - Meditation Timer & Tracker is simple and straightforward. Time each session and watch for visual light cues to let you know how long youve been meditating.  Take notes about each session, and watch your progress as you learn to manage your stress and anxiety. Virtual Hope Box  Platform: iPhone and Android  Cost: Free  The Automatic Data Box (VHB) is a smartphone application that contains simple tools to help with coping, relaxation, distraction, and positive thinking via personalized supportive audio, video, pictures, games, mindfulness exercises, positive messages and activity planning, inspirational quotes, coping statements, and other tools. Acupressure: Heal Yourself  Platform: iPhone and Android  Cost: $1.99  Acupressure is a natural healing strategy in which you target specific areas of the body in order to alleviate pain or unwanted symptoms. Acupressure can also increase blood flow, which can boost your mood and your health. This radha helps you find your bodys acupressure points. Apply pressure on those points when youre feeling overwhelmed, and receive the positive, calming benefit  PTSD : Self-Management of Posttraumatic Stress  Platform: Topadmithone and Android  Cost: Free  This radha can help you learn about and manage symptoms that often occur after trauma. Provides information and coping skills for common kinds of posttraumatic stress symptoms and problems, including systematic relaxation and self-help techniques. [place]: Feel better and live happier today  Platform: iPhone  Cost: Free  Daily mood and health tracking as well as journaling. Activities are based on mindfulness, relaxation and Cognitive Behavioral Therapy. Online support, networking and emails. CBT-i : Cognitive Behavioral Therapy for Insomnia  Platform: iPhone  Cost: Free  Identify sleep patterns with a sleep diary and assessment, tools to create new sleep habits, quiet your mind and prevent insomnia in the future. You can set reminders and learn about healthy sleep habits. Mindfulness   Platform: iPhone  Cost: Free  Mindfulness practice to decrease stress, manage emotional discomfort, depression, physical pain, and other problems.  It offers exercises, information, and a tracking log to that you can optimize practice. [employer]  Platform: iPhone  Cost: Free for the first month then $5.99/month for full access  [employer] is a platform to discover original content from top thought-leaders and experts across relationship, career, anxiety, sleep, addiction and more. Their proprietary Programs and Moodboosts help users gain new perspectives and tools to change behaviors and live life in forward motion. [employer] features:  Programs   Created exclusively for [employer], programs are designed to give users unique insights and tools to feed their appetite for personal growth. Users can listen to bite-sized Coaching sessions and learn new tools such as meditations, breathing exercises, visualizations and affirmations. By offering a holistic, multi-faceted approach, users will be able to reach peak mental performance and gain a deeper understanding of their physical, emotional and spiritual well-being. Moodboost   On a daily basis, people experience a wide range of emotions. Whether they're nervous about a work meeting, having trouble sleeping, or need an extra boost of confidence before their date, [employer]'s quick daily Moodboosts can help turn a user's negative thoughts into a positive state of mind. Boombotix   Users can track progress and star their favorite sessions and Moodboosts to help them stay on course of their personal growth journey. Users can create a daily mental wellness routine to help them form healthier habits and change behaviors. Ask the Experts   Users can submit personal questions to be answered by [employer] experts and see those posed by the community. They will also get Smooth Sailing tips and Words of [place]cho to help users navigate their day.

## 2022-09-13 ENCOUNTER — OFFICE VISIT (OUTPATIENT)
Dept: BEHAVIORAL/MENTAL HEALTH CLINIC | Age: 57
End: 2022-09-13
Payer: COMMERCIAL

## 2022-09-13 DIAGNOSIS — F41.1 GAD (GENERALIZED ANXIETY DISORDER): Primary | ICD-10-CM

## 2022-09-13 PROCEDURE — 90832 PSYTX W PT 30 MINUTES: CPT | Performed by: PSYCHOLOGIST

## 2022-09-13 ASSESSMENT — ANXIETY QUESTIONNAIRES
6. BECOMING EASILY ANNOYED OR IRRITABLE: 1
5. BEING SO RESTLESS THAT IT IS HARD TO SIT STILL: 1
GAD7 TOTAL SCORE: 9
IF YOU CHECKED OFF ANY PROBLEMS ON THIS QUESTIONNAIRE, HOW DIFFICULT HAVE THESE PROBLEMS MADE IT FOR YOU TO DO YOUR WORK, TAKE CARE OF THINGS AT HOME, OR GET ALONG WITH OTHER PEOPLE: NOT DIFFICULT AT ALL
7. FEELING AFRAID AS IF SOMETHING AWFUL MIGHT HAPPEN: 2
3. WORRYING TOO MUCH ABOUT DIFFERENT THINGS: 2
1. FEELING NERVOUS, ANXIOUS, OR ON EDGE: 1
2. NOT BEING ABLE TO STOP OR CONTROL WORRYING: 1
4. TROUBLE RELAXING: 1

## 2022-09-13 ASSESSMENT — PATIENT HEALTH QUESTIONNAIRE - PHQ9
9. THOUGHTS THAT YOU WOULD BE BETTER OFF DEAD, OR OF HURTING YOURSELF: 0
SUM OF ALL RESPONSES TO PHQ QUESTIONS 1-9: 3
1. LITTLE INTEREST OR PLEASURE IN DOING THINGS: 0
7. TROUBLE CONCENTRATING ON THINGS, SUCH AS READING THE NEWSPAPER OR WATCHING TELEVISION: 0
6. FEELING BAD ABOUT YOURSELF - OR THAT YOU ARE A FAILURE OR HAVE LET YOURSELF OR YOUR FAMILY DOWN: 1
4. FEELING TIRED OR HAVING LITTLE ENERGY: 0
SUM OF ALL RESPONSES TO PHQ QUESTIONS 1-9: 3
5. POOR APPETITE OR OVEREATING: 0
3. TROUBLE FALLING OR STAYING ASLEEP: 1
SUM OF ALL RESPONSES TO PHQ QUESTIONS 1-9: 3
8. MOVING OR SPEAKING SO SLOWLY THAT OTHER PEOPLE COULD HAVE NOTICED. OR THE OPPOSITE, BEING SO FIGETY OR RESTLESS THAT YOU HAVE BEEN MOVING AROUND A LOT MORE THAN USUAL: 0
2. FEELING DOWN, DEPRESSED OR HOPELESS: 1
SUM OF ALL RESPONSES TO PHQ QUESTIONS 1-9: 3
10. IF YOU CHECKED OFF ANY PROBLEMS, HOW DIFFICULT HAVE THESE PROBLEMS MADE IT FOR YOU TO DO YOUR WORK, TAKE CARE OF THINGS AT HOME, OR GET ALONG WITH OTHER PEOPLE: 0
SUM OF ALL RESPONSES TO PHQ9 QUESTIONS 1 & 2: 1

## 2022-09-13 NOTE — PROGRESS NOTES
No current facility-administered medications for this visit. Social History:   Social History     Socioeconomic History    Marital status:      Spouse name: Not on file    Number of children: Not on file    Years of education: Not on file    Highest education level: Not on file   Occupational History    Not on file   Tobacco Use    Smoking status: Never    Smokeless tobacco: Never   Substance and Sexual Activity    Alcohol use: No    Drug use: No    Sexual activity: Not on file   Other Topics Concern    Not on file   Social History Narrative    Not on file     Social Determinants of Health     Financial Resource Strain: Low Risk     Difficulty of Paying Living Expenses: Not hard at all   Food Insecurity: No Food Insecurity    Worried About Running Out of Food in the Last Year: Never true    Ran Out of Food in the Last Year: Never true   Transportation Needs: Not on file   Physical Activity: Not on file   Stress: Not on file   Social Connections: Not on file   Intimate Partner Violence: Not on file   Housing Stability: Not on file       TOBACCO:   reports that he has never smoked. He has never used smokeless tobacco.  ETOH:   reports no history of alcohol use. Family History:   Family History   Problem Relation Age of Onset    High Blood Pressure Sister     High Blood Pressure Mother          A:  Administered the PHQ-9, which indicates a self report of minimal depression. Patient was administered the MARIA INES-7, which indicates a self report of mild anxiety. Patient would benefit from [facility] services to increase coping skills to provide symptom management/control/relief.        PHQ Scores 9/13/2022 8/30/2022 6/1/2022 11/12/2021 7/17/2020 11/5/2019 11/15/2018   PHQ2 Score 1 0 0 0 0 0 0   PHQ9 Score 3 2 0 0 0 0 0     Interpretation of Total Score Depression Severity: 1-4 = Minimal depression, 5-9 = Mild depression, 10-14 = Moderate depression, 15-19 = Moderately severe depression, 20-27 = Severe depression      MARIA INES 7 SCORE 9/13/2022 8/30/2022   MARIA INES-7 Total Score 9 12     Interpretation of MARIA INES-7 score: 5-9 = mild anxiety, 10-14 = moderate anxiety, 15+ = severe anxiety. Recommend referral to behavioral health for scores 10 or greater. Diagnosis:    1. MARIA INES (generalized anxiety disorder)         R/O Social Anxiety Disorder        Diagnosis Date    Celiac disease     Hypercholesterolemia     Hypertension            Plan:  Return in about 3 weeks (around 10/4/2022). Pt interventions:  Trained in relaxation strategies, Conducted functional assessment, [place]-setting to identify pt's primary goals for [facility] visit / overall health, Supportive techniques, Emphasized self-care as important for managing overall health, Provided Psychoeducation re: Relaxation techniques (PMR, imagery, mindfulness), coping thoughts to increase balanced thinking, constructive worry time, and Emphasized importance of regular practice of relaxation strategies to target / promote symptom management/relief      Pt Behavioral Change Plan:  Review and try the additional relaxation techniques below    Try using the coping thoughts as noted below    Review and try the Leaves on a Stream exercise    Try the constructive worry time, as we discussed today    Follow up as scheduled         Please note this report has been partially produced using speech recognition software and may cause contain errors related to that system including grammar, punctuation, and spelling, as well as words and phrases that may seem inappropriate. If there are questions or concerns please feel free to contact me to clarify.

## 2022-09-13 NOTE — PATIENT INSTRUCTIONS
Relaxation Techniques  adapted from[employer] © 2013     When a person is confronted with anxiety, their body undergoes several changes and  enters a special state called the fight-or-flight response. The body prepares to either  fight or flee the perceived danger. During the fight-or-flight response its common to experience a blank mind, increased  heart rate, sweating, tense muscles, and more. Unfortunately, these bodily responses do  little good when it comes to protecting us from modern sources of anxiety. Using a variety of skills, you can end the fight-or-flight response before the symptoms  become too extreme. These skills will require practice to work effectively, so dont wait  until the last minute to try them out! Deep Breathing  Its natural to take long, deep breaths, when relaxed. However, during the fight-or-flight  response, breathing becomes rapid and shallow. Deep breathing reverses that, and sends messages to the brain to begin calming the body. Practice will make your body respond more efficiently to deep breathing in the future. Breathe in slowly. Count in your head and make sure the inward breath lasts at  least 5 seconds. Pay attention to the feeling of the air filling your lungs. Hold your breath for 5 to 10 seconds (again, keep count). You dont want to feel   uncomfortable, but it should last quite a bit longer than an ordinary breath. Breathe out very slowly for 5 to 10 seconds (count! Should be twice as long as  your inhale). Pretend like youre breathing through a straw to slow yourself  down. Try using a real straw to practice. Repeat the breathing process until you feel calm. Imagery  Think about some of your favorite and least favorite places. If you think about the place  hard enough--if you really try to think about what its like--you may begin to have feelings you associate with that location.  Our brain has the ability to create emotional reactions based entirely off of our thoughts. The imagery technique uses this to its advantage. Make sure youre somewhere quiet without too much noise or distraction. Youll need a  few minutes to just spend quietly, in your mind. Think of a place thats calming for you. Some examples are the beach, hiking on a mountain, relaxing at home with a friend, or playing with a pet. Paint a picture of the calming place in your mind. Dont just think of the place briefly--  imagine every little detail. Go through each of your senses and imagine what you would  experience in your relaxing place. Heres an example using a beach:      Sight: The sun is high in the amaya and youre surrounded by white sand. Theres  No one else around. The water is a greenish-blue and waves are calmly rolling  in from the ocean. Sound: You can hear the deep pounding and splashing of the waves. There are   seagulls somewhere in the background. Touch: The sun is warm on your back, but a breeze cools you down just enough. You can feel sand moving between my toes. Taste: You have a glass of lemonade thats sweet, tart, and refreshing. Smell: You can smell the fresh ocean air, full of salt and calming aromas. Progressive Muscle Relaxation  During the fight-or-flight response, the tension in our muscles increases. This can lead to a feeling of stiffness, or even back and neck pain. Progressive muscle relaxation teaches us to become more aware of this tension so we can better identify and address stress. Find a private and quiet location. You should sit or lie down somewhere comfortable. The idea of this technique is to intentionally tense each muscle, and then to release the tension. Lets practice with your feet:     *Tense the muscles in your toes by curling them into your foot. Notice how it   feels when your foot is tense. Hold the tension for 5 seconds. *Release the tension from your toes. Let them relax. Notice how your fingers feel   differently after you release the tension. *Tense the muscles all throughout your calf. Hold it for 5 seconds. Notice how   the feeling of tension in your leg feels. *Release the tension from your calf, and notice how the feeling of relaxation   differs. Follow this pattern of tensing and releasing tension all throughout your body. After you  finish with your feet and legs, move up through your torso, arms, hands, neck, and head. Progressive Muscle Relaxation (PMR) reduces tension and is incompatible with anxiety. The action of relaxing the muscles of your body blocks the stress response. PMR can be practiced lying down or in a chair. Each muscle group is tensed for 4-5 seconds and then relaxed for 20-30 seconds and the procedure is repeated at least once. While doing PMR only tighten your muscles enough to feel some tension (about a 1/3 to a 1/2 of fully tense state). You don't want to strain or hold your breath. The goal is to feel what the muscles feel like when they are tense so you can more fully relax them. Focus on the sensations of letting go of the tension and how it feels for the muscle to be completely relaxed. While doing PMR you want to try to notice the difference between the sensations of tension and relaxation. You may find it helpful to say one of the following expressions to yourself while doing PMR. Let go of tension. Calm and rested. Relax and smooth out the muscles. Let the tension go away. Let go more and more. Deeper and Deeper. The following is a procedure for PMR:  1. Get into a comfortable and relaxed posture. Feet on the floor, legs apart, back against your chair. Some people find it's more relaxing to do PMR with their eyes closed. 2. Take a few slow, deep comfortable breaths. Breathe in as deeply as you can, hold for a moment, and exhale. As you breathe in, concentrate just on the sound and feel of the air.   As you exhale completely, notice the warmth of the air and silently say the word calm (or choose a statement above) to yourself with each breath you let out. Take a few more slow deep breaths. Imagine your body becoming more relaxed and feeling heavier in your chair each time you exhale. 3. To begin the PMR, start with your legs. Lift your legs slightly off the ground, tense your thighs, and point your toes towards your head. Hold that position and feel the tension. Now let your legs drop to the ground and release all the tension at once. Notice the difference between the way your legs feel now when relaxed and how they felt when they were tense. 4. Next with your palms facing the ceiling, make a fist and raise your arm bringing your fist as close to your shoulder as you can while at the same time pressing your arms to your sides. Feel the tension in your fingers, hands, and arms. Now relax. As you relax you may notice your arms feel warm and heavy. Notice the difference between relaxation and tension in your arms. 5.  Remember to continue breathing slowly and deeply, and scan your legs, arms, and shoulders releasing any excess tension you notice. Focus on the sensation of relaxation in these areas. 6. Next is your face and neck. To tense your neck bring your chin to your chest or the back of your head to the back of your chair. While doing this squint your eyes and slightly bring your bath teeth together tensing just enough to feel the muscles in your jaw. Notice the tension in your face and neck: hold it. Now relax. Let go of all the tension from your face and neck. 7. Continue breathing slowly and enjoy the relaxed feelings throughout your entire body. Scan your body from your head to your toes and notice what your muscles feel like. As you are doing this take 5 more slow deep breaths. Adapted from SHIRLENE Ryan., Lo Machado M.S., Sin(2009).   Integrated Behavioral Health in Primary Care and Rebekah Fry.,  & Daron Chicas. (2000). The relaxation & stress reduction workbook (5th edition) and       Coping thoughts    Stress coping thoughts can put a stop to or at least soften painful emotions. The feedback loop from thoughts to physical reactions to behavioral choices to more negative thoughts can spiral on and on. Fortunately, your thoughts don't have to intensify your fear response. Instead, they can serve to calm your body and neutralize your feelings of high anxiety. The feedback loop can work for you as well as against you. Stress-coping thoughts tell your body there is no need for arousal - it can relax. Before and during any stressful situation,  You can begin saying to yourself a series of fear conquering statements. The more attention you give your coping thoughts, the faster you will experience relief from the flight or fight response. Nancie and Adrianna Franco ([id number]) suggest 4 times that you can use stress-coping statements:    1. When you are preparing to do something anxiety provoking and you are having thoughts that trigger anticipatory anxiety. 2. When you confront the stressful situation and you are having anxiety-provoking thoughts that interfere with your ability to concentrate and cope comfortably in the moment. 3. When you feel tension and need to be reminded to relax. 4. When you have completed an anxiety-provoking activity and want to acknowledge that you succeeded in getting through the stressful event using your coping skills. 1. Preparation  There's nothing to worry about. I'm going to be all right. I've succeeded with this before. What exactly to I have to do. I know I can do each 1 of these tasks. It's easier once you get started. I'll jump in and be all right. Tomorrow I'll be through it. Don't let negative thoughts creep in.    2. Confronting the stressful situation  Stay organized. Take it step by step, don't rush.   I can do this, seems to be no good solution at all, and that you will just have to live with it, write that down, with a note to yourself that maybe sometime soon you or someone you speak with will give you a clue that will lead you to a solution. Repeat this for any other concerns you have. Fold the Constructive Worry Worksheet in half and place it on the nightstand next to your bed and forget about it until bedtime.       At bedtime, if you begin to worry actually tell yourself that you have dealt with your problems already in the best way you know how, and when you were at your problem-solving best.  Remind yourself that you will be working on them again tomorrow evening and that nothing you can do while you are so tired can help you any more than what you have already done; more effort will only make the matters worst.    Constructive Worry Worksheet  [employer]     ________________________                         ______________________                           _______________________     __________________________        __________________________        __________________________         _________________________        __________________________        __________________________          __________________________        __________________________         __________________________

## 2022-09-20 ENCOUNTER — OFFICE VISIT (OUTPATIENT)
Dept: FAMILY MEDICINE CLINIC | Age: 57
End: 2022-09-20
Payer: COMMERCIAL

## 2022-09-20 VITALS
HEIGHT: 76 IN | HEART RATE: 88 BPM | WEIGHT: 198 LBS | SYSTOLIC BLOOD PRESSURE: 128 MMHG | OXYGEN SATURATION: 97 % | BODY MASS INDEX: 24.11 KG/M2 | DIASTOLIC BLOOD PRESSURE: 74 MMHG

## 2022-09-20 DIAGNOSIS — M25.551 HIP PAIN, RIGHT: ICD-10-CM

## 2022-09-20 DIAGNOSIS — M15.9 PRIMARY OSTEOARTHRITIS INVOLVING MULTIPLE JOINTS: ICD-10-CM

## 2022-09-20 DIAGNOSIS — M54.31 SCIATICA OF RIGHT SIDE: Primary | ICD-10-CM

## 2022-09-20 PROBLEM — M15.0 PRIMARY OSTEOARTHRITIS INVOLVING MULTIPLE JOINTS: Status: ACTIVE | Noted: 2022-09-20

## 2022-09-20 PROCEDURE — 99213 OFFICE O/P EST LOW 20 MIN: CPT

## 2022-09-20 RX ORDER — CYCLOBENZAPRINE HCL 10 MG
10 TABLET ORAL 3 TIMES DAILY PRN
Qty: 30 TABLET | Refills: 0 | Status: SHIPPED | OUTPATIENT
Start: 2022-09-20 | End: 2022-09-30

## 2022-09-20 RX ORDER — NAPROXEN 500 MG/1
500 TABLET ORAL 2 TIMES DAILY WITH MEALS
Qty: 60 TABLET | Refills: 0 | Status: SHIPPED | OUTPATIENT
Start: 2022-09-20

## 2022-09-20 RX ORDER — LIDOCAINE 4 G/G
1 PATCH TOPICAL DAILY
Qty: 30 PATCH | Refills: 0 | Status: SHIPPED | OUTPATIENT
Start: 2022-09-20 | End: 2022-10-20

## 2022-09-20 RX ORDER — METHYLPREDNISOLONE 4 MG/1
TABLET ORAL
Qty: 1 KIT | Refills: 0 | Status: SHIPPED | OUTPATIENT
Start: 2022-09-20 | End: 2022-09-26

## 2022-09-20 ASSESSMENT — ENCOUNTER SYMPTOMS
EYE DISCHARGE: 0
WHEEZING: 0
COLOR CHANGE: 0
DIARRHEA: 0
COUGH: 0
ABDOMINAL PAIN: 0
SORE THROAT: 0
NAUSEA: 0
VOMITING: 0
EYE ITCHING: 0
BACK PAIN: 1
SHORTNESS OF BREATH: 0
EYE PAIN: 0

## 2022-09-20 NOTE — PROGRESS NOTES
[facility] Encounter    SUBJECTIVE    CHIEF COMPLAINT:   Chief Complaint   Patient presents with    Other     Right hip pain going down leg into ankle    Pt states that it started last night but he has had back issues before       HPI:  Nicki Wu is a 65 y.o. male who presents to the walk-in clinic today for:     R hip pain described as 3/10 constant and dull that radiates down to foot. No alleviating factors. Standing aggravates pain - described as 7/10 stabbing. He has had ongoing back pain since his 25s - re aggravated the pain about 2.5- 3 months ago. The pain has been predominantly on his left side, however, it is now on his right side which is new for him. Beginning to have difficulty sleeping. Has not tried medication at home. Xray lumbar spine in 2015 showing degenetative disc disease with spurs at L1/2, L4/5, and L5/S1. He has tried chiropractor in past - states they tweaked his back causing more pain. Past Medical History:   Diagnosis Date    Celiac disease     Hypercholesterolemia     Hypertension        Current Outpatient Medications on File Prior to Visit   Medication Sig Dispense Refill    rosuvastatin (CRESTOR) 5 MG tablet TAKE 1 TABLET DAILY 90 tablet 1    olmesartan-hydroCHLOROthiazide (BENICAR HCT) 40-12.5 MG per tablet Take 1 tablet by mouth every day 90 tablet 1    amLODIPine (NORVASC) 5 MG tablet TAKE 1 TABLET daily 90 tablet 1     No current facility-administered medications on file prior to visit.        Family History   Problem Relation Age of Onset    High Blood Pressure Sister     High Blood Pressure Mother        Social History     Socioeconomic History    Marital status:      Spouse name: Not on file    Number of children: Not on file    Years of education: Not on file    Highest education level: Not on file   Occupational History    Not on file   Tobacco Use    Smoking status: Never    Smokeless tobacco: Never   Substance and Sexual Activity    Alcohol use: No    Drug use: No    Sexual activity: Not on file   Other Topics Concern    Not on file   Social History Narrative    Not on file     Social Determinants of Health     Financial Resource Strain: Low Risk     Difficulty of Paying Living Expenses: Not hard at all   Food Insecurity: No Food Insecurity    Worried About Running Out of Food in the Last Year: Never true    Ran Out of Food in the Last Year: Never true   Transportation Needs: Not on file   Physical Activity: Not on file   Stress: Not on file   Social Connections: Not on file   Intimate Partner Violence: Not on file   Housing Stability: Not on file       Allergies   Allergen Reactions    Buspar [Buspirone Hcl] Other (See Comments)     Chills and decreased appetite. Lopressor [Metoprolol]      Light headed       Review of Systems   Constitutional:  Negative for activity change, appetite change, fatigue and fever. HENT:  Negative for congestion, sneezing and sore throat. Eyes:  Negative for pain, discharge and itching. Respiratory:  Negative for cough, shortness of breath and wheezing. Cardiovascular:  Negative for chest pain and palpitations. Gastrointestinal:  Negative for abdominal pain, diarrhea, nausea and vomiting. Genitourinary:  Negative for difficulty urinating, enuresis and frequency. Musculoskeletal:  Positive for back pain. Negative for joint swelling. Skin:  Negative for color change, pallor and rash. Neurological:  Negative for dizziness, weakness, numbness and headaches. Psychiatric/Behavioral:  Positive for sleep disturbance. OBJECTIVE:  VITALS:  /74   Pulse 88   Ht 6' 4\" (1.93 m)   Wt 198 lb (89.8 kg)   SpO2 97%   BMI 24.10 kg/m²     Physical Exam  Vitals reviewed. Constitutional:       General: He is not in acute distress. Appearance: He is normal weight. HENT:      Head: Normocephalic. Musculoskeletal:         General: No signs of injury.       Cervical back: Normal. Normal range of motion. Thoracic back: Normal. Normal range of motion. Lumbar back: Normal. No deformity, signs of trauma, tenderness or bony tenderness. Normal range of motion. Negative right straight leg raise test and negative left straight leg raise test.      Right hip: Bony tenderness and crepitus (reported) present. No deformity. Normal strength. Left hip: Normal. Normal strength. Right upper leg: Normal.      Left upper leg: Normal.      Right lower leg: Normal.      Left lower leg: Normal.      Right foot: Normal.      Left foot: Normal.   Skin:     General: Skin is warm and dry. Findings: Rash present. No lesion. Neurological:      Mental Status: He is alert and oriented to person, place, and time. ASSESSMENT/PLAN:  1. Sciatica of right side  - methylPREDNISolone (MEDROL DOSEPACK) 4 MG tablet; Take by mouth. Dispense: 1 kit; Refill: 0  - cyclobenzaprine (FLEXERIL) 10 MG tablet; Take 1 tablet by mouth 3 times daily as needed for Muscle spasms  Dispense: 30 tablet; Refill: 0  - naproxen (NAPROSYN) 500 MG tablet; Take 1 tablet by mouth 2 times daily (with meals)  Dispense: 60 tablet; Refill: 0  - lidocaine 4 % external patch; Place 1 patch onto the skin daily  Dispense: 30 patch; Refill: 0  - XR LUMBAR SPINE (MIN 4 VIEWS); Future    2. Hip pain, right  - XR HIP RIGHT (2-3 VIEWS); Future  - as above    3. Primary osteoarthritis involving multiple joints  - as above    Return in about 4 weeks (around 10/18/2022) for reassessment with PCP. Follow up with PCP to evaluate treatment results or return if symptoms worsen or fail to improve. Discussed signs and symptoms which require immediate follow-up in ED/call to 911. Understanding verbalized. All prescribed medication today including purpose, proper use, and side effects discussed.  Treatment plan/rationale and result expectations have been discussed with the patient who expresses understanding and desires to proceed. An electronic signature was used to authenticate this note.   ALLA Lloyd - CNP

## 2022-10-04 ENCOUNTER — OFFICE VISIT (OUTPATIENT)
Dept: BEHAVIORAL/MENTAL HEALTH CLINIC | Age: 57
End: 2022-10-04
Payer: COMMERCIAL

## 2022-10-04 DIAGNOSIS — F41.1 GAD (GENERALIZED ANXIETY DISORDER): Primary | ICD-10-CM

## 2022-10-04 PROCEDURE — 90832 PSYTX W PT 30 MINUTES: CPT | Performed by: PSYCHOLOGIST

## 2022-10-04 ASSESSMENT — PATIENT HEALTH QUESTIONNAIRE - PHQ9
SUM OF ALL RESPONSES TO PHQ QUESTIONS 1-9: 2
3. TROUBLE FALLING OR STAYING ASLEEP: 0
7. TROUBLE CONCENTRATING ON THINGS, SUCH AS READING THE NEWSPAPER OR WATCHING TELEVISION: 0
6. FEELING BAD ABOUT YOURSELF - OR THAT YOU ARE A FAILURE OR HAVE LET YOURSELF OR YOUR FAMILY DOWN: 1
SUM OF ALL RESPONSES TO PHQ QUESTIONS 1-9: 2
2. FEELING DOWN, DEPRESSED OR HOPELESS: 1
SUM OF ALL RESPONSES TO PHQ9 QUESTIONS 1 & 2: 1
9. THOUGHTS THAT YOU WOULD BE BETTER OFF DEAD, OR OF HURTING YOURSELF: 0
4. FEELING TIRED OR HAVING LITTLE ENERGY: 0
5. POOR APPETITE OR OVEREATING: 0
SUM OF ALL RESPONSES TO PHQ QUESTIONS 1-9: 2
SUM OF ALL RESPONSES TO PHQ QUESTIONS 1-9: 2
8. MOVING OR SPEAKING SO SLOWLY THAT OTHER PEOPLE COULD HAVE NOTICED. OR THE OPPOSITE, BEING SO FIGETY OR RESTLESS THAT YOU HAVE BEEN MOVING AROUND A LOT MORE THAN USUAL: 0
10. IF YOU CHECKED OFF ANY PROBLEMS, HOW DIFFICULT HAVE THESE PROBLEMS MADE IT FOR YOU TO DO YOUR WORK, TAKE CARE OF THINGS AT HOME, OR GET ALONG WITH OTHER PEOPLE: 0
1. LITTLE INTEREST OR PLEASURE IN DOING THINGS: 0

## 2022-10-04 ASSESSMENT — ANXIETY QUESTIONNAIRES
1. FEELING NERVOUS, ANXIOUS, OR ON EDGE: 1
6. BECOMING EASILY ANNOYED OR IRRITABLE: 0
4. TROUBLE RELAXING: 0
5. BEING SO RESTLESS THAT IT IS HARD TO SIT STILL: 0
IF YOU CHECKED OFF ANY PROBLEMS ON THIS QUESTIONNAIRE, HOW DIFFICULT HAVE THESE PROBLEMS MADE IT FOR YOU TO DO YOUR WORK, TAKE CARE OF THINGS AT HOME, OR GET ALONG WITH OTHER PEOPLE: NOT DIFFICULT AT ALL
2. NOT BEING ABLE TO STOP OR CONTROL WORRYING: 1
GAD7 TOTAL SCORE: 3
7. FEELING AFRAID AS IF SOMETHING AWFUL MIGHT HAPPEN: 0
3. WORRYING TOO MUCH ABOUT DIFFERENT THINGS: 1

## 2022-10-04 NOTE — PROGRESS NOTES
Behavioral Health Consultation  Maddi Phipps PsyD, [address]   Psychologist  10/4/22  10:02 AM EDT      Time spent with Patient: 20 minutes  This is patient's third  [facility] appointment. Reason for Consult:  anxiety  Referring Provider: ALLA Jordan CNP    Feedback given to PCP. S:  Patient returns for continuation of [facility] services. Mood: \"Good\"  Sleep: \"Been good\"  Appetite: \"Good\"    Reported he had a few episodes where he felt himself becoming anxious; was able to use relaxation and mindfulness skills (especially breathing) to calm self successfully. Patient continues to listen to meditation/relaxing music prior to bed, which he feels helps him get to sleep without difficulty. Patient notes he has been aware of when he begins to speed up and spiral; stated he has been focusing on slowing down, focusing on his breathing, and using relaxation and mindfulness skills. Discussed termination of [facility] services; agreed to meeting in 1 month to review skills and progress, with goal of termination of services at that time (unless need arises to continue).        O:  MSE:    Appearance:  Alert, appropriately dressed, well groomed, well-appearing   Behavior:  Cooperative and Pleasant  Appetite:  normal  Sleep disturbance:  No  Fatigue:  No  Loss of pleasure:  No  Impulsive behavior:  No  Speech:  spontaneous, normal rate, normal volume, and well articulated  Mood:  \"Good\"  Affect:  Neutral/Euthymic(normal)  Thought Process:  Goal-Directed  Thought Content:  intact, logical, future-oriented  Associations:  logical connections  Insight:  good   Judgement:  good  Orientation:  oriented to person, place, time, and general circumstances  Memory:  recent and remote memory intact  Attention/Concentration:  intact  Morbid ideation:  No  Suicide Assessment:  no suicidal ideation      History:    Medications:   Current Outpatient Medications   Medication Sig Dispense Refill    naproxen (NAPROSYN) 500 MG tablet noted     Take 1 tablet by mouth 2 times daily (with meals) 60 tablet 0    lidocaine 4 % external patch Place 1 patch onto the skin daily 30 patch 0    rosuvastatin (CRESTOR) 5 MG tablet TAKE 1 TABLET DAILY 90 tablet 1    olmesartan-hydroCHLOROthiazide (BENICAR HCT) 40-12.5 MG per tablet Take 1 tablet by mouth every day 90 tablet 1    amLODIPine (NORVASC) 5 MG tablet TAKE 1 TABLET daily 90 tablet 1     No current facility-administered medications for this visit. Social History:   Social History     Socioeconomic History    Marital status:      Spouse name: Not on file    Number of children: Not on file    Years of education: Not on file    Highest education level: Not on file   Occupational History    Not on file   Tobacco Use    Smoking status: Never    Smokeless tobacco: Never   Substance and Sexual Activity    Alcohol use: No    Drug use: No    Sexual activity: Not on file   Other Topics Concern    Not on file   Social History Narrative    Not on file     Social Determinants of Health     Financial Resource Strain: Low Risk     Difficulty of Paying Living Expenses: Not hard at all   Food Insecurity: No Food Insecurity    Worried About Running Out of Food in the Last Year: Never true    Ran Out of Food in the Last Year: Never true   Transportation Needs: Not on file   Physical Activity: Not on file   Stress: Not on file   Social Connections: Not on file   Intimate Partner Violence: Not on file   Housing Stability: Not on file       TOBACCO:   reports that he has never smoked. He has never used smokeless tobacco.  ETOH:   reports no history of alcohol use. Family History:   Family History   Problem Relation Age of Onset    High Blood Pressure Sister     High Blood Pressure Mother          A:  Administered the PHQ-9, which indicates a self report of minimal depression and no associated symptom distress. Patient was administered the MARIA INES-7, which indicates a self report of minimal anxiety.  Patient has been doing well with utilizing relaxation and mindfulness skills learned to reduce/manage anxiety. PHQ Scores 10/4/2022 9/13/2022 8/30/2022 6/1/2022 11/12/2021 7/17/2020 11/5/2019   PHQ2 Score 1 1 0 0 0 0 0   PHQ9 Score 2 3 2 0 0 0 0     Interpretation of Total Score Depression Severity: 1-4 = Minimal depression, 5-9 = Mild depression, 10-14 = Moderate depression, 15-19 = Moderately severe depression, 20-27 = Severe depression      MARIA INES 7 SCORE 10/4/2022 9/13/2022 8/30/2022   MARIA INES-7 Total Score 3 9 12     Interpretation of MARIA INES-7 score: 5-9 = mild anxiety, 10-14 = moderate anxiety, 15+ = severe anxiety. Recommend referral to behavioral health for scores 10 or greater. Diagnosis:    1. MARIA INES (generalized anxiety disorder)       R/O Social Anxiety Disorder        Diagnosis Date    Celiac disease     Hypercholesterolemia     Hypertension            Plan:  Return in 4 weeks (on 11/1/2022). Pt interventions:  Conducted functional assessment, [place]-setting to identify pt's primary goals for [facility] visit / overall health, Supportive techniques, Emphasized self-care as important for managing overall health, Reviewed mindfulness and relaxation skills/strategies, Emphasized importance of regular practice of relaxation strategies to target / promote symptom management/relief, and Provided pt book recommendations related to mindfulness      Pt Behavioral Change Plan:  Follow up as scheduled         Please note this report has been partially produced using speech recognition software and may cause contain errors related to that system including grammar, punctuation, and spelling, as well as words and phrases that may seem inappropriate. If there are questions or concerns please feel free to contact me to clarify.

## 2022-10-18 ENCOUNTER — OFFICE VISIT (OUTPATIENT)
Dept: FAMILY MEDICINE CLINIC | Age: 57
End: 2022-10-18
Payer: COMMERCIAL

## 2022-10-18 VITALS
DIASTOLIC BLOOD PRESSURE: 76 MMHG | OXYGEN SATURATION: 97 % | WEIGHT: 205 LBS | SYSTOLIC BLOOD PRESSURE: 120 MMHG | HEART RATE: 74 BPM | HEIGHT: 76 IN | BODY MASS INDEX: 24.96 KG/M2

## 2022-10-18 DIAGNOSIS — H00.015 HORDEOLUM EXTERNUM OF LEFT LOWER EYELID: ICD-10-CM

## 2022-10-18 DIAGNOSIS — I10 ESSENTIAL HYPERTENSION: ICD-10-CM

## 2022-10-18 DIAGNOSIS — M54.50 LUMBAR PAIN: Primary | ICD-10-CM

## 2022-10-18 DIAGNOSIS — E78.00 HYPERCHOLESTEROLEMIA: ICD-10-CM

## 2022-10-18 DIAGNOSIS — Z23 NEED FOR INFLUENZA VACCINATION: ICD-10-CM

## 2022-10-18 PROCEDURE — 99214 OFFICE O/P EST MOD 30 MIN: CPT | Performed by: NURSE PRACTITIONER

## 2022-10-18 PROCEDURE — 90674 CCIIV4 VAC NO PRSV 0.5 ML IM: CPT | Performed by: NURSE PRACTITIONER

## 2022-10-18 PROCEDURE — 90471 IMMUNIZATION ADMIN: CPT | Performed by: NURSE PRACTITIONER

## 2022-10-18 RX ORDER — ATORVASTATIN CALCIUM 20 MG/1
20 TABLET, FILM COATED ORAL DAILY
Qty: 90 TABLET | Refills: 1 | Status: SHIPPED | OUTPATIENT
Start: 2022-10-18

## 2022-10-18 RX ORDER — OLMESARTAN MEDOXOMIL AND HYDROCHLOROTHIAZIDE 40/12.5 40; 12.5 MG/1; MG/1
TABLET ORAL
Qty: 90 TABLET | Refills: 1 | Status: SHIPPED | OUTPATIENT
Start: 2022-10-18

## 2022-10-18 RX ORDER — AMLODIPINE BESYLATE 5 MG/1
TABLET ORAL
Qty: 90 TABLET | Refills: 1 | Status: SHIPPED | OUTPATIENT
Start: 2022-10-18

## 2022-10-18 ASSESSMENT — ENCOUNTER SYMPTOMS
DIARRHEA: 0
COUGH: 0
CONSTIPATION: 0
SHORTNESS OF BREATH: 0
BACK PAIN: 1

## 2022-10-18 NOTE — PROGRESS NOTES
Subjective  Chief Complaint   Patient presents with    Back Pain     1 month follow up   Pain is alittle better         HPI    Was at walk in for back pain/hip pain about a month ago. Does seem to be improving. Much improved from previous visit. Was traveling from hip down the leg. No pain with immobility. Some days better than others. Took steroid back which helped. Lidocaine patches didn't help. Muscle relaxer  Naproxen for a few days which helped. Struggles to bend over straight. Anxiety- doing some better. Has been following with psychologist.     Impression   Mild degenerative changes seen within the hips bilaterally with no evidence   of acute bony abnormality. Impression   Minimal multilevel degenerative changes seen within the lumbar spine with no   evidence of fracture or dislocation. Pt also has a \"lump\" on lower eyelid. Seems to be somewhat improving but wanted me to look at it.     Patient Active Problem List    Diagnosis Date Noted    Primary osteoarthritis involving multiple joints 09/20/2022    Back abscess 07/17/2020    Seasonal allergies 05/16/2016    Tachycardia 04/17/2015    Left inguinal hernia 87/27/9046    Umbilical hernia 56/06/0182    Celiac disease 03/09/2015    Breast lump on right side at 6 o'clock position 10/23/2014    Hypertension     Hypercholesterolemia      Past Medical History:   Diagnosis Date    Celiac disease     Hypercholesterolemia     Hypertension      Past Surgical History:   Procedure Laterality Date    COLONOSCOPY  12/19/11    normal    HERNIA REPAIR Left 5/8/15    repair of ventral hernia with mesh and left inguinal hernia repair Dr Sedrick Domínguez Right     TONSILLECTOMY       Family History   Problem Relation Age of Onset    High Blood Pressure Sister     High Blood Pressure Mother      Social History     Socioeconomic History    Marital status:      Spouse name: None    Number of children: None    Years of education: None    Highest education level: None   Tobacco Use    Smoking status: Never    Smokeless tobacco: Never   Substance and Sexual Activity    Alcohol use: No    Drug use: No     Social Determinants of Health     Financial Resource Strain: Low Risk     Difficulty of Paying Living Expenses: Not hard at all   Food Insecurity: No Food Insecurity    Worried About [address] in the Last Year: Never true    Ran Out of Food in the Last Year: Never true     Current Outpatient Medications on File Prior to Visit   Medication Sig Dispense Refill    naproxen (NAPROSYN) 500 MG tablet Take 1 tablet by mouth 2 times daily (with meals) 60 tablet 0    lidocaine 4 % external patch Place 1 patch onto the skin daily 30 patch 0    rosuvastatin (CRESTOR) 5 MG tablet TAKE 1 TABLET DAILY 90 tablet 1     No current facility-administered medications on file prior to visit. Allergies   Allergen Reactions    Buspar [Buspirone Hcl] Other (See Comments)     Chills and decreased appetite. Lopressor [Metoprolol]      Light headed       Review of Systems   Constitutional:  Negative for fatigue. Respiratory:  Negative for cough and shortness of breath. Cardiovascular:  Negative for chest pain. Gastrointestinal:  Negative for constipation and diarrhea. Musculoskeletal:  Positive for arthralgias, back pain and gait problem. Psychiatric/Behavioral:  The patient is nervous/anxious. Objective  Vitals:    10/18/22 1001   BP: 120/76   Pulse: 74   SpO2: 97%   Weight: 205 lb (93 kg)   Height: 6' 4\" (1.93 m)     Physical Exam  Vitals and nursing note reviewed. Constitutional:       Appearance: Normal appearance. HENT:      Head: Normocephalic. Nose: Nose normal.      Mouth/Throat:      Mouth: Mucous membranes are moist.      Pharynx: Oropharynx is clear. Eyes:      Extraocular Movements: Extraocular movements intact.       Conjunctiva/sclera: Conjunctivae normal.      Pupils: Pupils are equal, round, and reactive to light. Cardiovascular:      Rate and Rhythm: Normal rate and regular rhythm. Pulses: Normal pulses. Heart sounds: Normal heart sounds. Pulmonary:      Effort: Pulmonary effort is normal.      Breath sounds: Normal breath sounds. Musculoskeletal:      Cervical back: Neck supple. Lumbar back: Spasms and tenderness present. Decreased range of motion. Skin:     General: Skin is warm. Neurological:      General: No focal deficit present. Mental Status: He is alert and oriented to person, place, and time. Mental status is at baseline. Psychiatric:         Mood and Affect: Mood normal.         Behavior: Behavior normal.         Thought Content: Thought content normal.         Judgment: Judgment normal.       Assessment & Plan     Diagnosis Orders   1. Lumbar pain  Ambulatory referral to Physical Therapy      2. Essential hypertension  olmesartan-hydroCHLOROthiazide (BENICAR HCT) 40-12.5 MG per tablet    amLODIPine (NORVASC) 5 MG tablet      3. Hypercholesterolemia  atorvastatin (LIPITOR) 20 MG tablet      4. Need for influenza vaccination  Influenza, FLUCELVAX, (age 10 mo+), IM, Preservative Free, 0.5 mL      5.  Hordeolum externum of left lower eyelid  Warm compresses to eye daily          Orders Placed This Encounter   Procedures    Influenza, FLUCELVAX, (age 10 mo+), IM, Preservative Free, 0.5 mL    Ambulatory referral to Physical Therapy     Referral Priority:   Routine     Referral Type:   Eval and Treat     Referral Reason:   Specialty Services Required     Requested Specialty:   Physical Therapist     Number of Visits Requested:   1       Orders Placed This Encounter   Medications    olmesartan-hydroCHLOROthiazide (BENICAR HCT) 40-12.5 MG per tablet     Sig: Take 1 tablet by mouth every day     Dispense:  90 tablet     Refill:  1     Refill [id number]    amLODIPine (NORVASC) 5 MG tablet     Sig: TAKE 1 TABLET daily     Dispense:  90 tablet     Refill:  1    atorvastatin (LIPITOR) 20 MG tablet     Sig: Take 1 tablet by mouth daily     Dispense:  90 tablet     Refill:  1       Medications Discontinued During This Encounter   Medication Reason    olmesartan-hydroCHLOROthiazide (BENICAR HCT) 40-12.5 MG per tablet REORDER    amLODIPine (NORVASC) 5 MG tablet REORDER      Side effects, adverse effects of the medication prescribed today, as well as treatment plan/ rationale and result expectations have been discussed with the patient who expresses understanding and desires to proceed. Close follow up to evaluate treatment results and for coordination of care. I have reviewed the patient's medical history in detail and updated the computerized patient record. As always, patient is advised that if symptoms worsen in any way they will proceed to the nearest emergency room.        Lanny King, APRN - CNP

## 2022-10-18 NOTE — PROGRESS NOTES
Vaccine Information Sheet, \"Influenza - Inactivated\"  given to Danilo Soto, or parent/legal guardian of  Danilo Soto and verbalized understanding. Patient responses:    Have you ever had a reaction to a flu vaccine? No  Are you able to eat eggs without adverse effects? Yes  Do you have any current illness? No  Have you ever had Guillian [place] Syndrome? No    Flu vaccine given per order. Please see immunization tab.

## 2022-11-01 ENCOUNTER — OFFICE VISIT (OUTPATIENT)
Dept: BEHAVIORAL/MENTAL HEALTH CLINIC | Age: 57
End: 2022-11-01
Payer: COMMERCIAL

## 2022-11-01 DIAGNOSIS — F41.1 GAD (GENERALIZED ANXIETY DISORDER): Primary | ICD-10-CM

## 2022-11-01 PROCEDURE — 90832 PSYTX W PT 30 MINUTES: CPT | Performed by: PSYCHOLOGIST

## 2022-11-01 ASSESSMENT — ANXIETY QUESTIONNAIRES
GAD7 TOTAL SCORE: 3
IF YOU CHECKED OFF ANY PROBLEMS ON THIS QUESTIONNAIRE, HOW DIFFICULT HAVE THESE PROBLEMS MADE IT FOR YOU TO DO YOUR WORK, TAKE CARE OF THINGS AT HOME, OR GET ALONG WITH OTHER PEOPLE: NOT DIFFICULT AT ALL
1. FEELING NERVOUS, ANXIOUS, OR ON EDGE: 1
3. WORRYING TOO MUCH ABOUT DIFFERENT THINGS: 1
6. BECOMING EASILY ANNOYED OR IRRITABLE: 0
4. TROUBLE RELAXING: 0
2. NOT BEING ABLE TO STOP OR CONTROL WORRYING: 1
5. BEING SO RESTLESS THAT IT IS HARD TO SIT STILL: 0
7. FEELING AFRAID AS IF SOMETHING AWFUL MIGHT HAPPEN: 0

## 2022-11-01 ASSESSMENT — PATIENT HEALTH QUESTIONNAIRE - PHQ9
1. LITTLE INTEREST OR PLEASURE IN DOING THINGS: 0
SUM OF ALL RESPONSES TO PHQ9 QUESTIONS 1 & 2: 1
6. FEELING BAD ABOUT YOURSELF - OR THAT YOU ARE A FAILURE OR HAVE LET YOURSELF OR YOUR FAMILY DOWN: 1
3. TROUBLE FALLING OR STAYING ASLEEP: 1
2. FEELING DOWN, DEPRESSED OR HOPELESS: 1
4. FEELING TIRED OR HAVING LITTLE ENERGY: 0
8. MOVING OR SPEAKING SO SLOWLY THAT OTHER PEOPLE COULD HAVE NOTICED. OR THE OPPOSITE, BEING SO FIGETY OR RESTLESS THAT YOU HAVE BEEN MOVING AROUND A LOT MORE THAN USUAL: 0
SUM OF ALL RESPONSES TO PHQ QUESTIONS 1-9: 3
10. IF YOU CHECKED OFF ANY PROBLEMS, HOW DIFFICULT HAVE THESE PROBLEMS MADE IT FOR YOU TO DO YOUR WORK, TAKE CARE OF THINGS AT HOME, OR GET ALONG WITH OTHER PEOPLE: 0
SUM OF ALL RESPONSES TO PHQ QUESTIONS 1-9: 3
7. TROUBLE CONCENTRATING ON THINGS, SUCH AS READING THE NEWSPAPER OR WATCHING TELEVISION: 0
5. POOR APPETITE OR OVEREATING: 0
9. THOUGHTS THAT YOU WOULD BE BETTER OFF DEAD, OR OF HURTING YOURSELF: 0

## 2022-11-01 NOTE — PATIENT INSTRUCTIONS
How To Question Stressful, Angry, Anxious, or Depressed Thinking    Am I upsetting myself unnecessarily? How can I see this another way? Is my thinking working for or against me? How could I view this in a less upsetting way? What am I demanding must happen? What do I want or prefer, rather than need? Am I making something too terrible? Is that awful? What would be so terrible about that? Am I labeling a person? What is the action that I dont like? What is untrue about my thoughts? How can I stick to the facts? Am I using extreme, black-and-white language? What words might be more accurate? Am I fortune-telling or mind-reading in a way that gets me upset or unhappy? What are the odds  or chances that it will really turn out the way Im thinking or imagining? What are my options in this situation? How would I like to respond? What are more moderate, helpful, or realistic statements to replace the upsetting ones? Have I had any experiences that show that this thought might not be completely true? If my best friend or someone I loved had this thought, what would I tell them? If someone I cared about knew I was thinking this thought, what would they say to me? Are there strengths in me or positives in the situation that I am ignoring? When I am not feeling this way, do I think about this situation any differently? How? Have I been in this type of situation before? What happened? What have I learned from prior experiences that could help me now? Five years from now, if I look back on this situation, will I look at it any differently? Will I focus on any different part of my experience? Am I blaming myself for something over which I do not have complete control? Thinking Mistakes That Create Stress, Anger, Depression, Anxiety, and Worry    All-or-nothing thinking. You see things in black-and-white categories. It is either one thing or another; there is no room for anything in between.  Im 100% healthy or I must have a fatal disease.   Jumping to conclusions. You make a negative interpretation even though there are no definite facts that convincingly support your conclusion. My  is late because he is in a car accident and is injured on the side of the road.   Fortune-telling. You anticipate things will turn out badly, convinced the prediction is a fact. Not getting this job will cause us to lose the house.    Should statements. Musts and oughts are also offenders. Emotional consequences can include anxiety and anger. I should be able to handle this.    Overgeneralization. Assuming one event is actually a pattern. My hand is a little shaky today, I must have Parkinsons Disease.   Disqualifying the positive. Filtering out or rejecting positive experiences to maintain negative beliefs. Upon hearing that your spouse has checked all the doors and windows and they are all locked you think, But someone could cut out a piece of glass and open the window.   Catastrophizing. Predicting the worst possible outcome imaginable. Terrible, awful, horrible, worst ever might be key words. If I cant get my heart to stop pounding Im going to die.      Superstitious thinking. The thought that something you do prevents something awful from happening. Indira Schmidt my spouse a hug and telling her to be careful before going to work will prevent her from getting in a wreck. I do it every morning and she hasnt gotten in a wreck yet.   Emotional reasoning. The belief that because you feel a certain way means that the assumptions and associations you have with that feeling are true. The fear, doom, and constant anxiety must mean something is seriously wrong with me.     Disputing or Challenging Thoughts or Beliefs  Activating event  What happened? Consequences  How did I get myself to respond? Thoughts/beliefs  What am I telling myself? What thinking mistake am I making?  Evidence for  thoughts,  beliefs/self-talk Evidence against thoughts,  beliefs/self-talk What different thoughts can I have based on the evidence for and against my original way of thinking? How did or might my responses change with my new way of thinking?    Physically   (What are my body responses?)            Physically   (What are my new body responses?)     Emotionally   (How do I feel?)      Emotionally   (How do I feel?)   Behaviorally   (What did I do?)              Behaviorally   (What did I do?)

## 2022-11-01 NOTE — PROGRESS NOTES
Behavioral Health Consultation  Julissa Cast PsyD, [address] [facility]  Psychologist  22  11:08 AM EDT      Time spent with Patient: 30 minutes  This is patient's fourth  [facility] appointment. Reason for Consult:  anxiety  Referring Provider: ALLA Calvo CNP    Patient provided informed consent for the behavioral health program. Discussed with patient model of service to include the limits of confidentiality (i.e. abuse reporting, suicide intervention, etc.) and short-term intervention focused approach vs traditional counseling/psychotherapy. Discussed that this evaluation is not for the purposes of determination of competency, disability determination, custody or parental fitness, presurgical clearance, or for any forensic determination; nor are [facility] services typically sufficient for any court-ordered treatment requirements. Further, it was discussed with patient that specific evaluation of certain conditions (e.g., ADHD, LD, cognitive impairment, memory issues, etc.) may necessitate referral to a specialty mental health provider in the community for formal psychometric/neuropsychological testing and evaluation, which cannot be performed through [facility] services. Patient indicated understanding. Feedback given to PCP. S:  Patient returns for continuation of [facility] services. Mood: \"A little anxious lately and don't know why; I manage though\"  Sleep: \"I have had a couple nights I didn't sleep too well lately, but for the most part it's okay\"  Appetite: \"Good\"    Started playing bridge in person instead of online; has done a few tournaments. Said he experiences anxiety, particularly in social situations; uses relaxation skills and works on balancing thoughts. Noted he no longer catastrophizes and is often able to calm himself over time. Will be starting PT next week to address some back and hip pain.      Offered to continue services; pt feels he is at a good place right now and is able to manage successfully - reviewed skills learned and discussed ability to return if needed in the future        O:  MSE:    Appearance:  Alert, casually dressed, well groomed, well-appearing  Behavior:  Anxious, Cooperative, and Pleasant  Appetite:  normal  Sleep disturbance:  Some recently, but reports is ok overall  Fatigue:  No  Loss of pleasure:  No  Impulsive behavior:  No  Speech:  spontaneous, normal rate, and normal volume  Mood:  \"A little anxious lately and don't know why; I manage though\"  Affect:  Neutral/Euthymic(normal)  Thought Process:  Goal-Directed  Thought Content:  intact  Associations:  logical connections  Insight:  good   Judgement:  good  Orientation:  oriented to person, place, time, and general circumstances  Memory:  recent and remote memory intact  Attention/Concentration:  intact  Morbid ideation:  No  Suicide Assessment:  no suicidal ideation      History:    Medications:   Current Outpatient Medications   Medication Sig Dispense Refill    olmesartan-hydroCHLOROthiazide (BENICAR HCT) 40-12.5 MG per tablet Take 1 tablet by mouth every day 90 tablet 1    amLODIPine (NORVASC) 5 MG tablet TAKE 1 TABLET daily 90 tablet 1    atorvastatin (LIPITOR) 20 MG tablet Take 1 tablet by mouth daily 90 tablet 1    naproxen (NAPROSYN) 500 MG tablet Take 1 tablet by mouth 2 times daily (with meals) 60 tablet 0    rosuvastatin (CRESTOR) 5 MG tablet TAKE 1 TABLET DAILY 90 tablet 1     No current facility-administered medications for this visit.        Social History:   Social History     Socioeconomic History    Marital status:      Spouse name: Not on file    Number of children: Not on file    Years of education: Not on file    Highest education level: Not on file   Occupational History    Not on file   Tobacco Use    Smoking status: Never    Smokeless tobacco: Never   Substance and Sexual Activity    Alcohol use: No    Drug use: No    Sexual activity: Not on file   Other Topics Concern    Not on file   Social

## 2022-11-07 ENCOUNTER — HOSPITAL ENCOUNTER (OUTPATIENT)
Dept: PHYSICAL THERAPY | Age: 57
Setting detail: THERAPIES SERIES
Discharge: HOME | End: 2022-11-07
Payer: COMMERCIAL

## 2022-11-07 PROCEDURE — 97162 PT EVAL MOD COMPLEX 30 MIN: CPT

## 2022-11-07 PROCEDURE — 97110 THERAPEUTIC EXERCISES: CPT

## 2022-11-07 ASSESSMENT — PAIN SCALES - GENERAL: PAINLEVEL_OUTOF10: 1

## 2022-11-07 ASSESSMENT — PAIN DESCRIPTION - LOCATION: LOCATION: BACK;HIP

## 2022-11-07 ASSESSMENT — PAIN DESCRIPTION - FREQUENCY: FREQUENCY: CONTINUOUS

## 2022-11-07 ASSESSMENT — PAIN - FUNCTIONAL ASSESSMENT: PAIN_FUNCTIONAL_ASSESSMENT: PREVENTS OR INTERFERES SOME ACTIVE ACTIVITIES AND ADLS

## 2022-11-07 ASSESSMENT — PAIN DESCRIPTION - DESCRIPTORS: DESCRIPTORS: DULL

## 2022-11-07 ASSESSMENT — PAIN DESCRIPTION - ORIENTATION: ORIENTATION: RIGHT;LOWER;POSTERIOR

## 2022-11-07 NOTE — PLAN OF CARE
[facility]  PHYSICAL THERAPY PLAN OF CARE   [facility]      [address], [address] [facility], [address]     Ph: [phone] Fax: [phone]      [] Certification  [] Recertification [x]  Plan of Care  [] Progress Note [] Discharge      Referring Provider: ALLA Rosales -*      From:  Cynthia Bishop, PT   Patient: Hawa Retana (39 y.o. male) : 1965 Date: 2022   Medical Diagnosis: Lumbar pain [M54.50]    Treatment Diagnosis: back pain, impaired mobility and tolerance    Plan of Care/Certification Expiration Date: :     Progress Report Period from:  2022  to 2022    Visits to Date: 1 No Show: 0 Cancelled Appts: 0    OBJECTIVE:   Short Term Goals - Time Frame for Short Term Goals: 2 wks    Goals Current/Discharge status  Status   Short Term Goal 1: Independent with HEP to promote home management of symptoms  Need for HEP  New   Short Term Goal 2: Pt will report 25% improvement of symptoms with decreased difficulty donning socks and shoes  1-3/10 pain with 8/10 initial c/o pain  New     Long Term Goals - Time Frame for Long Term Goals : 6 wks  Goals Current/ Discharge status Status   Long Term Goal 1: Improve LE strength >/= 4+/5 to improve ease with stairs and yard work Strength RLE  R Hip Flexion: 4-/5  R Hip Extension: 4-/5  R Hip ABduction: 4-/5  R Hip Internal Rotation: 4+/5  R Hip External Rotation: 4-/5  R Knee Flexion: 4+/5  R Knee Extension: 5/5  R Ankle Dorsiflexion: 5/5  Strength LLE  L Hip Flexion: 4/5  L Hip Extension: 4/5  L Hip ABduction: 4/5  L Hip Internal Rotation: 4+/5  L Hip External Rotation: 4/5  L Knee Flexion: 4+/5  L Knee Extension: 5/5  L Ankle Dorsiflexion: 5/5    New   Long Term Goal 2: Improve SLR and lumbar ROM 5-10 degrees to improve ease with ADLs and housework PROM RLE (degrees)  RLE General PROM: 64  AROM RLE (degrees)  R Hip Extension 0-10: 5  PROM LLE (degrees)  LLE General PROM: 54  AROM LLE (degrees)  L Hip Extension 0-10: 5    New   Long Term Goal 3: Oswestry </= 6 to demonstrate improved overall activity tolerance 14 New   Long Term Goal 4: Pt will demonstrate good understanding of proper body mechanics Need for education        Body Structures, Functions, Activity Limitations Requiring Skilled Therapeutic Intervention: Decreased functional mobility , Decreased ROM, Decreased body mechanics, Decreased strength, Increased pain, Decreased posture  Assessment: Pt presents with c/o low back and bilateral LE pain affecting mobility, activity tolerance, and quality of life. Pt reports initial onset of pain very debilitating, but improved with medical treatment. Pt now demonstrates decreased LE strength and flexibility, and decreased tolerance to prolonged positioning. Pt would benefit from skilled PT to address deficits and return to previous function with minimal pain. Therapy Prognosis: Good    PLAN: [x] Evaluate and Treat  Frequency/Duration:  Plan Frequency: 2  Plan weeks: 6  Current Treatment Recommendations: Strengthening, ROM, Functional mobility training, Endurance training, Gait training, Manual Therapy - Soft Tissue Mobilization, Home exercise program, Safety education & training, Patient/Caregiver education & training, Equipment evaluation, education, & procurement, Integrated dry needling, Modalities                Patient Status:[x] Continue/ Initiate plan of Care    [] Discharge PT. Recommend pt continue with HEP. [] Additional visits requested, Please re-certify for additional visits:    [] Hold         Signature: Electronically signed by Lani Bennett PT on 11/7/22 at 12:47 PM EST    If you have any questions or concerns, please don't hesitate to call.   Thank you for your referral.

## 2022-11-07 NOTE — PROGRESS NOTES
[facility]) Physical Therapy-  [facility]   PHYSICAL THERAPY EVALUATION      Physical Therapy: Initial Evaluation    Patient: Colt Lemos (66 y.o.     male)   Examination Date:   Plan of Care Certification Period: 2022 to    Progress Note Counter:    :  1965 ;    Confirmed: Yes MRN: [id number]  CSN: [id number]   Insurance: Payor: Ronnie Hollingsworthard / Plan: Evin Hay RUT / Product Type: *No Product type* /   Insurance ID: [id number] - (Commercial) Secondary Insurance (if applicable):    Referring Physician: ALLA Huang -*     PCP: ALLA Sabillon CNP Visits to Date/Visits Approved:     No Show/Cancelled Appts: 0      Medical Diagnosis: Lumbar pain [M54.50]    Treatment Diagnosis: back pain, impaired mobility and tolerance     PERTINENT MEDICAL HISTORY      Self reported health status[de-identified] Good    Medical History: Chart Reviewed: Yes   Past Medical History:   Diagnosis Date    Celiac disease     Hypercholesterolemia     Hypertension      Surgical History:   Past Surgical History:   Procedure Laterality Date    COLONOSCOPY  11    normal    HERNIA REPAIR Left 5/8/15    repair of ventral hernia with mesh and left inguinal hernia repair Dr Miya Guillaume Right     TONSILLECTOMY         Medications:   Current Outpatient Medications:     olmesartan-hydroCHLOROthiazide (BENICAR HCT) 40-12.5 MG per tablet, Take 1 tablet by mouth every day, Disp: 90 tablet, Rfl: 1    amLODIPine (NORVASC) 5 MG tablet, TAKE 1 TABLET daily, Disp: 90 tablet, Rfl: 1    atorvastatin (LIPITOR) 20 MG tablet, Take 1 tablet by mouth daily, Disp: 90 tablet, Rfl: 1    naproxen (NAPROSYN) 500 MG tablet, Take 1 tablet by mouth 2 times daily (with meals), Disp: 60 tablet, Rfl: 0    rosuvastatin (CRESTOR) 5 MG tablet, TAKE 1 TABLET DAILY, Disp: 90 tablet, Rfl: 1  Allergies: Buspar [buspirone hcl] and Lopressor [metoprolol]      SUBJECTIVE EXAMINATION History obtained from[de-identified] Patient, Chart Review,      Family/Caregiver Present: No    Subjective History: Onset Date: 06/30/22  Subjective: Pt reports 25 year h/o LBP. Original injury with bowling. Recent exacerbation 6/30/22 with no precipitating event. Pt states pain was severe, affecting posture and most activities. Pt states improved briefly, then worsened with radicular pain bilateral LEs Rt > Lt. (usually Lt > Rt). Imaging with no sig abnormalities. Prednisone pack with relief, then slowly returning. Pain Rt posterolateral hip with mild radicular symptoms. No longer radiating to ankle. Pt reports continued difficulty with bending. Pt states history of job with heavy lifting. Increased syptoms with sneezing. Pt denies current numbness and tingling. Additional Pertinent Hx (if applicable): anxiety, panic attacks, celiac   Prior diagnostic testing[de-identified] X-ray (Minimal multilevel degenerative changes seen within the lumbar spine with no  evidence of fracture or dislocation.  Mild degenerative changes seen within the hips bilaterally with no evidence  of acute bony abnormality.)      Learning/Language: Learning  Does the patient/guardian have any barriers to learning?: No barriers  Will there be a co-learner?: No  What is the preferred language of the patient/guardian?: English  Is an  required?: No  How does the patient/guardian prefer to learn new concepts?: Listening, Demonstration, Reading     Pain Screening    Pain Screening  Patient Currently in Pain: Yes  Pain Assessment: 0-10  Pain Level: 1  Best Pain Level: 1  Worst Pain Level: 3 ((worst originally 8/10))  Pain Location: Back, Hip  Pain Orientation: Right, Lower, Posterior  Pain Descriptors: Dull  Pain Frequency: Continuous  Functional Pain Assessment: Prevents or interferes some active activities and ADLs (occ affects sleeping)  Aggravating factors: Lifting (worse in am, loosens up throughout the day)  Pain Management/Relieving Factors: Walking    Functional Status    Social History:    Social History  Lives With: Family  Type of Home: House  Home Layout: Two level (some difficulty with stairs due to knees, but improved since initial onset - able to do reciprocally)  Home Access: Stairs to enter with rails  Entrance Stairs - Number of Steps: 1    Occupation/Interests:   Occupation: Retired  Type of Occupation: retired in February - worked 35 years for [employer]  Job Duties: [address]: walking, playing bridge, [employer]    Prior Level of Function:     Independent        Current Level of Function:   independent with ADLs with modifications, trying to avoid aggravating activities - vacuuming, raking, dishes - but with modifications General   Bed Mobility: log rolling to minimize pain    ADL Assistance: Independent  Homemaking Assistance:  (avoiding aggravating tasks)  Ambulation Assistance: Independent  Transfer Assistance: Independent  Active : Yes         OBJECTIVE EXAMINATION     Restrictions:          NA    Review of Systems:  Vision: Impaired  Visual Deficits: Wears glasses  Hearing: Within functional limits  Overall Orientation Status: Within Functional Limits  Patient affect[de-identified] Normal  Follows Commands: Within Functional Limits    VBI Screening / Lumbar Screening:    Bowel/bladder disturbances: No  Saddle anesthesia: No  Severe motor weakness: No  Stumbling or giving way while walking: Yes  Unrelenting pain at night: No    Observations:   General Observations  Cervical: Forward head posture  Thoracic Spine: Thoracic Hyperkyphosis  Shoulders: Rounded  Scapulae: Right Protracted, Left Protracted  Spine / Pelvis: Decreased lumbar lordosis  Description: slightly lower Rt inferior border of scap and elevated Rt iliac crest    Palpation:   Lumbar Spine Palpation: tenderness L2-4, bilateral PSIS, bilateral glute, no sig pain anywhere, negative paraspinals    Mobility:    Ambulation  Surface: Carpet, Level tile  Device: No Device  Assistance: Independent  Quality of Gait: wide STERLING, decreased step length and step height  Distance: unlimited within clinic    Left AROM  Right AROM         AROM LLE (degrees)  L Hip Extension 0-10: 5    AROM RLE (degrees)  R Hip Extension 0-10: 5        Left PROM  Right PROM         PROM LLE (degrees)  LLE General PROM: 54    PROM RLE (degrees)  RLE General PROM: 64        Left Strength  Right Strength         Strength LLE  L Hip Flexion: 4/5  L Hip Extension: 4/5  L Hip ABduction: 4/5  L Hip Internal Rotation: 4+/5  L Hip External Rotation: 4/5  L Knee Flexion: 4+/5  L Knee Extension: 5/5  L Ankle Dorsiflexion: 5/5    Strength RLE  R Hip Flexion: 4-/5  R Hip Extension: 4-/5  R Hip ABduction: 4-/5  R Hip Internal Rotation: 4+/5  R Hip External Rotation: 4-/5  R Knee Flexion: 4+/5  R Knee Extension: 5/5  R Ankle Dorsiflexion: 5/5     Lumbar Assessment     AROM Lumbar Spine   Flexion: 32  Extension: 12  Daryl evaluation  Repeated movements completed: Yes  Standing flexion: Worse  Standing extension: Same     Trunk Strength      Fair - strength     Special Tests:   Special Tests Lumbar Spine  PIYUSH Test: R (-), L (-)  SLR: R (+), L (+)  Slump Test: R (+), L (+)  Pelvic Compression: (-)  Pelvic Distraction: (-)    Outcomes Score:  Exam: musculoskeletal, pain and sensory systems involved impacting strength, flexibility, gait; Oswestry: 14     Treatment:    Exercises:   Exercises  Exercise 1: LTR x10  Exercise 2: supine pelvic tilts 5sec 10  Exercise 3: shoulder shrugs, rolls, retraction  Exercise 4: standing wall ext  Exercise 5: * bridges  Exercise 6: * 3 way SLR  Exercise 7: * thoracic open book  Exercise 8: * TFL str  Exercise 9: * ITB str  Exercise 10: * Nu step with DLS  Exercise 11: * Core strength  Exercise 12: * hams str  Exercise 13: * body mechanics trg  Exercise 20: HEP: LTR, posture exs, pelvic tilts  Treatment Reasoning  Limitations addressed:  Mobility, Strength, Flexibility, Activity tolerance, Posture  Modalities:  Modalities:  (*PRN IFC LB - has tried TENS unit in past with min relief)     Manual:  Manual Therapy  Joint Mobilization: * lumbar PA to improve ROM  Manual Traction: * leg pulls  Soft Tissue Mobilizaton: * lumbar paraspinals, glute  Other: * MFD cupping paraspinals, consider IDN cluneals, paraspinals, homeostatic  Treatment Reasoning  Limitations addressed: Joint motion, Tissue extensibility, Painful spasm  *Indicates exercise,modality, or manual techniques to be initiated when appropriate       ASSESSMENT     Impression: Assessment: Pt presents with c/o low back and bilateral LE pain affecting mobility, activity tolerance, and quality of life. Pt reports initial onset of pain very debilitating, but improved with medical treatment. Pt now demonstrates decreased LE strength and flexibility, and decreased tolerance to prolonged positioning. Pt would benefit from skilled PT to address deficits and return to previous function with minimal pain. Body Structures, Functions, Activity Limitations Requiring Skilled Therapeutic Intervention: Decreased functional mobility , Decreased ROM, Decreased body mechanics, Decreased strength, Increased pain, Decreased posture    Statement of Medical Necessity: Physical Therapy is both indicated and medically necessary as outlined in the POC to increase the likelihood of meeting the functionally related goals stated below. Patient's Activity Tolerance: Patient tolerated treatment well      Patient's rehabilitation potential/prognosis is considered to be: Good    Factors which may impact rehabilitation potential include: None     Patient Education:   HEP, evaluative findings     GOALS   Patient Goal(s): Patient Goals : help relieve back, hip and leg pain.  strengthen those areas    Short Term Goals Completed by 2 wks Goal Status   Independent with HEP to promote home management of symptoms New   Pt will report 25% improvement of symptoms with decreased difficulty donning socks and shoes New     Long Term Goals Completed by 6 wks Goal Status   Improve LE strength >/= 4+/5 to improve ease with stairs and yard work [employer] and lumbar ROM 5-10 degrees to improve ease with ADLs and housework New   Oswestry </= 6 to demonstrate improved overall activity tolerance New   Pt will demonstrate good understanding of proper body mechanics          TREATMENT PLAN       Treatment may include any combination of the following: Strengthening, ROM, Functional mobility training, Endurance training, Gait training, Manual Therapy - Soft Tissue Mobilization, Home exercise program, Safety education & training, Patient/Caregiver education & training, Equipment evaluation, education, & procurement, Integrated dry needling, Modalities     Frequency / Duration:  Patient to be seen 2 times per week for 6 weeks  Plan Comment:               Eval Complexity:   Decision Making: Medium Complexity  History: Personal Factors and/or Comorbidities Impacting POC: Medium  History: personal factors: age, sedentary, pain; contributing PMH: anxiety, panic attacks, celiac  Examination of body system(s) including body structures and functions, activity limitations, and/or participation restrictions: Medium  Exam: musculoskeletal, pain and sensory systems involved impacting strength, flexibility, gait; Oswestry: 14  Clinical Presentation: Medium  Clinical Presentation: complicated    POST-PAIN     Pain Rating (0-10 pain scale):   1/10  Location and pain description same as pre-treatment unless indicated. Action: [x] NA  [] Call Physician  [] Perform HEP  [] Meds as prescribed    Evaluation and patient rights have been reviewed and patient agrees with plan of care.   Yes  [x]  No  []   Explain:     Kristel Fall Risk Assessment  Risk Factor Scale  Score   History of Falls [] Yes  [x] No 25  0 0   Secondary Diagnosis [] Yes  [x] No 15  0 0   Ambulatory Aid [] Furniture  [] Crutches/cane/walker  [x] None/bedrest/wheelchair/nurse 30  15  0 0   IV/Heparin Lock [] Yes  [x] No 20  0 0   Gait/Transferring [] Impaired  [] Weak  [x] Normal/bedrest/immobile 20  10  0 0   Mental Status [] Forgets limitations  [x] Oriented to own ability 15  0 0      Total:0     Based on the Assessment score: check the appropriate box.   [x]  No intervention needed   Low =   Score of 0-24  []  Use standard prevention interventions Moderate =  Score of 24-44   [] Discuss fall prevention strategies   [] Indicate moderate falls risk on eval  []  Use high risk prevention interventions High = Score of 45 and higher   [] Discuss fall prevention strategies   [] Provide supervision during treatment time      Minutes:  PT Individual Minutes  Time In: 1004  Time Out: 1055  Minutes: 51  Timed Code Treatment Minutes: 10 Minutes  Procedure Minutes: 41     Timed Activity Minutes Units   Ther Ex 10 1     Electronically signed by Jenna Sanderson PT on 11/7/22 at 12:44 PM EST

## 2022-11-15 ENCOUNTER — HOSPITAL ENCOUNTER (OUTPATIENT)
Dept: PHYSICAL THERAPY | Age: 57
Setting detail: THERAPIES SERIES
Discharge: HOME | End: 2022-11-15
Payer: COMMERCIAL

## 2022-11-15 DIAGNOSIS — I10 ESSENTIAL HYPERTENSION: ICD-10-CM

## 2022-11-15 PROCEDURE — 97110 THERAPEUTIC EXERCISES: CPT

## 2022-11-15 RX ORDER — AMLODIPINE BESYLATE 5 MG/1
TABLET ORAL
Qty: 90 TABLET | Refills: 1 | Status: SHIPPED | OUTPATIENT
Start: 2022-11-15

## 2022-11-15 RX ORDER — OLMESARTAN MEDOXOMIL AND HYDROCHLOROTHIAZIDE 40/12.5 40; 12.5 MG/1; MG/1
TABLET ORAL
Qty: 90 TABLET | Refills: 1 | Status: SHIPPED | OUTPATIENT
Start: 2022-11-15

## 2022-11-15 ASSESSMENT — PAIN DESCRIPTION - ORIENTATION: ORIENTATION: RIGHT;LEFT;POSTERIOR;ANTERIOR

## 2022-11-15 ASSESSMENT — PAIN DESCRIPTION - LOCATION: LOCATION: HIP

## 2022-11-15 ASSESSMENT — PAIN SCALES - GENERAL: PAINLEVEL_OUTOF10: 1

## 2022-11-15 NOTE — TELEPHONE ENCOUNTER
Medication Refill  (Newest Message First)  Ivonne Smart  Patient Medication Renewal Request Pool 1 hour ago (9:06 AM)     Refills have been requested for the following medications:         olmesartan-hydroCHLOROthiazide (BENICAR HCT) 40-12.5 MG per tablet Erven Spurling, APRN - CNP]         amLODIPine (NORVASC) 5 MG tablet Erven Spurling, APRN - CNP]     Preferred pharmacy: [id number] [place] Ponce [phone] [place] [phone]        Future Appointments    Encounter Information    Provider Department Appt Notes   11/15/2022 MLOZ VERMILION PT [address] Out Patient Therapy and Rehabilitation Specialty Services Required   11/21/2022 Manju ZARATE PT [address] Out Patient Therapy and Rehabilitation Specialty Services Required   11/22/2022 EDWIN ZARATE PT [address] Out Patient Therapy and Rehabilitation Specialty Services Required   11/28/2022 Manju ZARATE PT [address] Out Patient Therapy and Rehabilitation Specialty Services Required   11/30/2022 Manju ZARATE PT [address] Out Patient Therapy and Rehabilitation Specialty Services Required   12/5/2022 Flores Guzmán, PT [address] [facility] Patient Therapy and Rehabilitation Specialty Services Required   2/20/2023 Erven Spurling, APRN - CNP [facility] [place] [facility] Return in about 6 months (around 2/17/2023)     Past Visits    Date Provider Specialty Visit Type Primary Dx   11/01/2022 Dani Teague PSYD Behavioral Health Office Visit MARIA INES (generalized anxiety disorder)   10/18/2022 Erven Spurling, APRN - CNP Family Medicine Office Visit Lumbar pain   10/04/2022 Dani Teague PSYD Behavioral Health Office Visit MARIA INES (generalized anxiety disorder)   09/20/2022 ALLA Gamino CNP Family Medicine Office Visit Sciatica of right side   09/13/2022 Dani Teague PSYD Behavioral Health Office Visit MARIA INES (generalized anxiety disorder)

## 2022-11-15 NOTE — PROGRESS NOTES
[address]  Outpatient Physical Therapy    Treatment Note        Date: 11/15/2022  Patient: Donnie Councilman  : 1965   Confirmed: Yes  MRN: [id number]  Referring Provider: ALLA Cordova -*   Secondary Referring Provider (If applicable):     Medical Diagnosis: Lumbar pain [M54.50]    Treatment Diagnosis: back pain, impaired mobility and tolerance    Visit Information:  Insurance: Payor: Sea Masker / Plan: Trish Jeffers RUT / Product Type: *No Product type* /   PT Visit Information  PT Insurance Information: Olga  Total # of Visits Approved: 12  Total # of Visits to Date: 2  No Show: 0  Canceled Appointment: 0  Progress Note Counter:     Subjective Information:  Subjective: Pt reports increase in pain after therapy lasting 3 days. LTR exacerbate the pain and has discontinued doing them at home. HEP Compliance:  [] Good [] Fair [x] Poor [] Reports not doing due to:    Pain Screening  Patient Currently in Pain: Yes  Pain Assessment: 0-10  Pain Level: 1  Pain Location: Hip  Pain Orientation: Right, Left, Posterior, Anterior    Treatment:  Exercises:  Exercises  Exercise 1: LTR x10  Exercise 5: bridges 10/\"  Exercise 6: 3 way SLR x10 bilaterally  Exercise 8: TFL str 3/30\" bilaterally  Exercise 9: ITB str  Exercise 10: Nu step L2x5 min with DLS  Exercise 12: hams str 3/30\"  Exercise 20: HEP:continue current HEP with addition HS, TFL, ITB stretch  Treatment Reasoning  Limitations addressed: Mobility, Strength, Flexibility, Activity tolerance, Posture       Assessment: Body Structures, Functions, Activity Limitations Requiring Skilled Therapeutic Intervention: Decreased functional mobility , Decreased ROM, Decreased body mechanics, Decreased strength, Increased pain, Decreased posture  Assessment: Initiated PT program per POC with focus on increasing functional mobility. Pt with decreased strength and stamina throughout during supine exercises.   Pt educated on performing LTR with correct technique to minimize pain LBP. Instructed pt in flexibility exercises with decrease ROM throughout. Treatment Diagnosis: back pain, impaired mobility and tolerance  Therapy Prognosis: Good          Post-Pain Assessment:       Pain Rating (0-10 pain scale):   1/10   Location and pain description same as pre-treatment unless indicated. Action: [] NA   [x] Perform HEP  [] Meds as prescribed  [] Modalities as prescribed   [] Call Physician     GOALS   Patient Goal(s): Patient Goals : help relieve back, hip and leg pain. strengthen those areas    Short Term Goals Completed by 2 wks Goal Status   STG 1 Independent with HEP to promote home management of symptoms In progress   STG 2 Pt will report 25% improvement of symptoms with decreased difficulty donning socks and shoes In progress       Long Term Goals Completed by 6 wks Goal Status   LTG 1 Improve LE strength >/= 4+/5 to improve ease with stairs and yard work In progress   LTG 2 Improve SLR and lumbar ROM 5-10 degrees to improve ease with ADLs and housework In progress   LTG 3 Oswestry </= 6 to demonstrate improved overall activity tolerance In progress   LTG 4 Pt will demonstrate good understanding of proper body mechanics              Plan:  Frequency/Duration:  Plan  Plan Frequency: 2  Plan weeks: 6  Current Treatment Recommendations: Strengthening, ROM, Functional mobility training, Endurance training, Gait training, Manual Therapy - Soft Tissue Mobilization, Home exercise program, Safety education & training, Patient/Caregiver education & training, Equipment evaluation, education, & procurement, Integrated dry needling, Modalities  Pt to continue current HEP. See objective section for any therapeutic exercise changes, additions or modifications this date.     Therapy Time:      PT Individual Minutes  Time In: 9262  Time Out: 602 [address]  Minutes: 40  Timed Code Treatment Minutes: 40 Minutes  Procedure Minutes:  Timed Activity Minutes Units   Ther Ex 40 3     Electronically signed by Esperanza East PTA on 11/15/22 at 4:26 PM EST

## 2022-11-21 ENCOUNTER — HOSPITAL ENCOUNTER (OUTPATIENT)
Dept: PHYSICAL THERAPY | Age: 57
Setting detail: THERAPIES SERIES
Discharge: HOME | End: 2022-11-21
Payer: COMMERCIAL

## 2022-11-21 PROCEDURE — 97140 MANUAL THERAPY 1/> REGIONS: CPT

## 2022-11-21 PROCEDURE — 97110 THERAPEUTIC EXERCISES: CPT

## 2022-11-21 ASSESSMENT — PAIN DESCRIPTION - LOCATION: LOCATION: HIP

## 2022-11-21 ASSESSMENT — PAIN SCALES - GENERAL: PAINLEVEL_OUTOF10: 1

## 2022-11-21 ASSESSMENT — PAIN DESCRIPTION - DESCRIPTORS: DESCRIPTORS: DULL;TIGHTNESS

## 2022-11-21 ASSESSMENT — PAIN DESCRIPTION - FREQUENCY: FREQUENCY: CONTINUOUS

## 2022-11-21 NOTE — PROGRESS NOTES
[facility]  Outpatient Physical Therapy    Treatment Note        Date: 2022  Patient: Yun Sun  : 1965   Confirmed: Yes  MRN: [id number]  Referring Provider: ALLA Gavin - CNP    Medical Diagnosis: Lumbar pain [M54.50]       Treatment Diagnosis: back pain, impaired mobility and tolerance    Visit Information:  Insurance: Payor: Vanesa Favorite / Plan: Jessica Grills RUT / Product Type: *No Product type* /   PT Visit Information  PT Insurance Information: Donny Jose  Total # of Visits Approved: 12  Total # of Visits to Date: 3  No Show: 0  Canceled Appointment: 0  Progress Note Counter: 3/12    Subjective Information:  Subjective: Pt states \"More just stiffness today. I'm usually pretty tight in the mornings and it gets better throughout the day. \"  HEP Compliance:  [x] Good [] Fair [] Poor [] Reports not doing due to:    Pain Screening  Patient Currently in Pain: Yes  Pain Assessment: 0-10  Pain Level: 1  Pain Location: Hip  Pain Descriptors: Dull, Tightness  Pain Frequency: Continuous    Treatment:  Exercises:  Exercises  Exercise 1: LTR x10  Exercise 2: supine pelvic tilts 5sec 10  Exercise 5: PPT w/ bridges 10/5\" (limited range)  Exercise 6: 3 way SLR x10 bilaterally  Exercise 8: Standing hip flexor stretch (foot on 12\" box) 3x30\" b/l- to aid in lumbar ext mobility  Exercise 9: ITB str  Exercise 10: Nu step L3 x5 min with DLS  Exercise 11: Core strength: marches x10  Exercise 12: seated hams str 3/30\", piriformis stretch (altnating knee up/down) 3x30\" b/l  Exercise 20: HEP:continue current HEP+hip flexor and piriformis stretches  Treatment Reasoning  Limitations addressed:  Mobility, Strength, Flexibility, Activity tolerance, Posture    Manual:   Manual Therapy  Joint Mobilization: * lumbar PA to improve ROM  Manual Traction: leg pulls: modified in H/L 30\"x5 (mild relief)  Soft Tissue Mobilizaton: * lumbar paraspinals, glute  Other: * MFD cupping paraspinals, consider IDN cluneals, paraspinals, homeostatic     Modalities:  Pt declines- pt utilizes heating pad at home     Objective Measures:      Strength: [x] NT  [] MMT completed:     ROM: [x] NT  [] ROM measurements:      Assessment: Body Structures, Functions, Activity Limitations Requiring Skilled Therapeutic Intervention: Decreased functional mobility , Decreased ROM, Decreased body mechanics, Decreased strength, Increased pain, Decreased posture  Assessment: Continued to progress on current strengthening/mobility routine to work towards LTG's. Addition of hip flexor stretches to aid in improving b/l hip and lumbar extension mobility w/ good tolerance. Visible limitations w/ Rt vs Lt hip w/ most strethes; cues given to keep range within tolerance. Trial of manual lumbar distraction in H/L as pt w/ poor gilbert to supine positioning w/ b/l knees extended; min relief. Treatment Diagnosis: back pain, impaired mobility and tolerance  Therapy Prognosis: Good     Post-Pain Assessment:       Pain Rating (0-10 pain scale):   1/10   Location and pain description same as pre-treatment unless indicated. Action: [] NA   [x] Perform HEP  [] Meds as prescribed  [x] Modalities as prescribed   [] Call Physician     GOALS   Patient Goal(s): Patient Goals : help relieve back, hip and leg pain.  strengthen those areas    Short Term Goals Completed by 2 wks Goal Status   STG 1 Independent with HEP to promote home management of symptoms In progress   STG 2 Pt will report 25% improvement of symptoms with decreased difficulty donning socks and shoes In progress     Long Term Goals Completed by 6 wks Goal Status   LTG 1 Improve LE strength >/= 4+/5 to improve ease with stairs and yard work In progress   LTG 2 Improve SLR and lumbar ROM 5-10 degrees to improve ease with ADLs and housework In progress   LTG 3 Oswestry </= 6 to demonstrate improved overall activity tolerance In progress   LTG 4 Pt will demonstrate good understanding of proper body mechanics Plan:  Frequency/Duration:  Plan  Plan Frequency: 2  Plan weeks: 6  Current Treatment Recommendations: Strengthening, ROM, Functional mobility training, Endurance training, Gait training, Manual Therapy - Soft Tissue Mobilization, Home exercise program, Safety education & training, Patient/Caregiver education & training, Equipment evaluation, education, & procurement, Integrated dry needling, Modalities  Pt to continue current HEP. See objective section for any therapeutic exercise changes, additions or modifications this date.     Therapy Time:      PT Individual Minutes  Time In: 4388  Time Out: 1612  Minutes: 47  Timed Code Treatment Minutes: 47 Minutes  Procedure Minutes: N/A   Timed Activity Minutes Units   Ther Ex 42 3   Manual  5 0     Electronically signed by Jena French PTA on 11/21/22 at 4:14 PM EST

## 2022-11-22 ENCOUNTER — HOSPITAL ENCOUNTER (OUTPATIENT)
Dept: PHYSICAL THERAPY | Age: 57
Setting detail: THERAPIES SERIES
Discharge: HOME | End: 2022-11-22
Payer: COMMERCIAL

## 2022-11-22 PROCEDURE — 97140 MANUAL THERAPY 1/> REGIONS: CPT

## 2022-11-22 PROCEDURE — 97110 THERAPEUTIC EXERCISES: CPT

## 2022-11-22 ASSESSMENT — PAIN DESCRIPTION - ORIENTATION: ORIENTATION: RIGHT;LEFT

## 2022-11-22 ASSESSMENT — PAIN DESCRIPTION - FREQUENCY: FREQUENCY: CONTINUOUS

## 2022-11-22 ASSESSMENT — PAIN DESCRIPTION - DESCRIPTORS: DESCRIPTORS: DULL

## 2022-11-22 ASSESSMENT — PAIN DESCRIPTION - LOCATION: LOCATION: HIP

## 2022-11-22 NOTE — PROGRESS NOTES
[facility]  Outpatient Physical Therapy    Treatment Note        Date: 2022  Patient: Niki Zelaya  : 1965   Confirmed: Yes  MRN: [id number]  Referring Provider: ALLA Mi CNP    Medical Diagnosis: Lumbar pain [M54.50]       Treatment Diagnosis: back pain, impaired mobility and tolerance    Visit Information:  Insurance: Payor: Josiah Rule / Plan: Marhaileysusan Youngpepito RUT / Product Type: *No Product type* /   PT Visit Information  PT Insurance Information: Melvin Talamantes  Total # of Visits Approved: 12  Total # of Visits to Date: 4  No Show: 0  Canceled Appointment: 0  Progress Note Counter:     Subjective Information:  Subjective: Pt reports current pain as \"very little\" and mostly \"in the hip. \"  HEP Compliance:  [x] Good [] Fair [] Poor [] Reports not doing due to:    Pain Screening  Patient Currently in Pain: Yes  Pain Assessment: 0-10  Pain Location: Hip  Pain Orientation: Right, Left  Pain Descriptors: Dull  Pain Frequency: Continuous    Treatment:  Exercises:  Exercises  Exercise 1: LTR x10  Exercise 2: supine pelvic tilts 5sec 10  Exercise 3: Clamshells x10 b/l  Exercise 4: standing wall ext x10  Exercise 5: PPT w/ bridges 2x10/5\" w/ RTB around knees (limited range)  Exercise 6: 3 way SLR x10 bilaterally (SLR and S/L hip ABD only, pt prefers to avoid prone)  Exercise 7: Step ups*  Exercise 8: Standing hip flexor stretch (foot on 12\" box) 3x30\" b/l- to aid in lumbar ext mobility  Exercise 9: ITB str in S/L 10\"x5 b/l  Exercise 10: Nu step L3 x5 min with DLS  Exercise 11: Core strength: marches x15, DLS (dead bug) B24  Exercise 12: seated hams str 3/30\", piriformis stretch (altnating knee up/down) 3x30\" b/l  Exercise 13: SKTC 30\"x3  Exercise 20: HEP:continue current HEP+dead bug, clamshells  Treatment Reasoning  Limitations addressed:  Mobility, Strength, Flexibility, Activity tolerance, Posture    Manual:   Manual Therapy  Soft Tissue Mobilizaton: STM to lat hip/glute and ITB, Lt (w/ tiger tail and TBall)     Objective Measures:       Strength: [x] NT  [] MMT completed:     ROM: [x] NT  [] ROM measurements:     Assessment: Body Structures, Functions, Activity Limitations Requiring Skilled Therapeutic Intervention: Decreased functional mobility , Decreased ROM, Decreased body mechanics, Decreased strength, Increased pain, Decreased posture  Assessment: Pt reporting recent c/o Rt hip pain> LBP causing limitations in walking and stair climbing. Focus of tx on progressing areas of mobility/strength in areas of notable deficit. Exs tolerated well within limited range w/ isolation of hip ABD strengthening most difficult for pt. Concluded tx w/ STM to Rt hip/glute musculate to further address pain/tightness w/ relief. Discussed benefits of both and heat as needed for pain management at home. Pain today most localized at posterior apsect of greater trochanter. Treatment Diagnosis: back pain, impaired mobility and tolerance  Therapy Prognosis: Good     Post-Pain Assessment:       Pain Rating (0-10 pain scale):   1/10   Location and pain description same as pre-treatment unless indicated. Action: [] NA   [x] Perform HEP  [] Meds as prescribed  [x] Modalities as prescribed   [] Call Physician     GOALS   Patient Goal(s): Patient Goals : help relieve back, hip and leg pain.  strengthen those areas    Short Term Goals Completed by 2 wks Goal Status   STG 1 Independent with HEP to promote home management of symptoms In progress   STG 2 Pt will report 25% improvement of symptoms with decreased difficulty donning socks and shoes In progress     Long Term Goals Completed by 6 wks Goal Status   LTG 1 Improve LE strength >/= 4+/5 to improve ease with stairs and yard work In progress   LTG 2 Improve SLR and lumbar ROM 5-10 degrees to improve ease with ADLs and housework In progress   LTG 3 Oswestry </= 6 to demonstrate improved overall activity tolerance In progress   LTG 4 Pt will demonstrate good understanding of proper body mechanics       Plan:  Frequency/Duration:  Plan  Plan Frequency: 2  Plan weeks: 6  Current Treatment Recommendations: Strengthening, ROM, Functional mobility training, Endurance training, Gait training, Manual Therapy - Soft Tissue Mobilization, Home exercise program, Safety education & training, Patient/Caregiver education & training, Equipment evaluation, education, & procurement, Integrated dry needling, Modalities  Pt to continue current HEP. See objective section for any therapeutic exercise changes, additions or modifications this date.     Therapy Time:      PT Individual Minutes  Time In: 3494  Time Out: 1700  Minutes: 46  Timed Code Treatment Minutes: 46 Minutes  Procedure Minutes: N/A   Timed Activity Minutes Units   Ther Ex 36 2   Manual  10 1     Electronically signed by Audrea Primrose, PTA on 11/22/22 at 5:17 PM EST

## 2022-11-28 ENCOUNTER — HOSPITAL ENCOUNTER (OUTPATIENT)
Dept: PHYSICAL THERAPY | Age: 57
Setting detail: THERAPIES SERIES
Discharge: HOME | End: 2022-11-28
Payer: COMMERCIAL

## 2022-11-28 PROCEDURE — 97110 THERAPEUTIC EXERCISES: CPT

## 2022-11-28 ASSESSMENT — PAIN DESCRIPTION - ORIENTATION: ORIENTATION: LEFT;RIGHT

## 2022-11-28 ASSESSMENT — PAIN DESCRIPTION - DESCRIPTORS: DESCRIPTORS: DULL

## 2022-11-28 ASSESSMENT — PAIN DESCRIPTION - LOCATION: LOCATION: HIP

## 2022-11-28 ASSESSMENT — PAIN SCALES - GENERAL: PAINLEVEL_OUTOF10: 1

## 2022-11-28 ASSESSMENT — PAIN DESCRIPTION - FREQUENCY: FREQUENCY: CONTINUOUS

## 2022-11-28 NOTE — PROGRESS NOTES
[facility]  Outpatient Physical Therapy    Treatment Note        Date: 2022  Patient: Maranda England  : 1965   Confirmed: Yes  MRN: [id number]  Referring Provider: ALLA Apodaca CNP    Medical Diagnosis: Lumbar pain [M54.50]       Treatment Diagnosis: back pain, impaired mobility and tolerance    Visit Information:  Insurance: Payor: Sherwin House / Plan: Tim Li RUT / Product Type: *No Product type* /   PT Visit Information  PT Insurance Information: Nohemi Kennedy  Total # of Visits Approved: 12  Total # of Visits to Date: 5  No Show: 0  Canceled Appointment: 0  Progress Note Counter:     Subjective Information:  Subjective: Pt states \"The pain is maybe a 1, more stiff than anything. \"  HEP Compliance:  [x] Good [] Fair [] Poor [] Reports not doing due to:    Pain Screening  Patient Currently in Pain: Yes  Pain Assessment: 0-10  Pain Level: 1  Pain Location: Hip  Pain Orientation: Left, Right  Pain Descriptors: Dull  Pain Frequency: Continuous    Treatment:  Exercises:  Exercises  Exercise 1: LTR x10  Exercise 2: supine pelvic tilts 5sec 10  Exercise 4: standing wall ext x10  Exercise 6: 2 way SLR x12 bilaterally (SLR and S/L hip ABD only, pt prefers to avoid prone)  Exercise 7: Step ups*  Exercise 8: Standing hip flexor stretch (foot on 12\" box) 3x30\" b/l- to aid in lumbar ext mobility  Exercise 9: ITB str in S/L 10\"x5 b/l  Exercise 10: Nu step L4 x5 min with DLS  Exercise 11: Core strength: marches x15, DLS (dead bug) N63  Exercise 12: seated hams str 3/30\", piriformis stretch (altnating knee up/down) 3x30\" b/l  Exercise 13: SKTC 30\"x3  Exercise 14: 3 way midback stretch 10\"x5 ea  Exercise 15: Paloff presses  at [employer] x20 single black TB, rows/lats BTB x10 ea  Exercise 20: HEP: rows, lats and paloff presses w/ TB  Treatment Reasoning  Limitations addressed:  Mobility, Strength, Flexibility, Activity tolerance, Posture    Objective Measures:      Strength: [x] NT  [] MMT Treatment Recommendations: Strengthening, ROM, Functional mobility training, Endurance training, Gait training, Manual Therapy - Soft Tissue Mobilization, Home exercise program, Safety education & training, Patient/Caregiver education & training, Equipment evaluation, education, & procurement, Integrated dry needling, Modalities  Pt to continue current HEP. See objective section for any therapeutic exercise changes, additions or modifications this date.     Therapy Time:      PT Individual Minutes  Time In: 1300  Time Out: 6790  Minutes: 45  Timed Code Treatment Minutes: 45 Minutes  Procedure Minutes: N/A   Timed Activity Minutes Units   Ther Ex 45 3     Electronically signed by Emiliano Carvalho PTA on 11/28/22 at 1:57 PM EST

## 2022-11-30 ENCOUNTER — HOSPITAL ENCOUNTER (OUTPATIENT)
Dept: PHYSICAL THERAPY | Age: 57
Setting detail: THERAPIES SERIES
Discharge: HOME | End: 2022-11-30
Payer: COMMERCIAL

## 2022-11-30 PROCEDURE — 97110 THERAPEUTIC EXERCISES: CPT

## 2022-11-30 ASSESSMENT — PAIN SCALES - GENERAL: PAINLEVEL_OUTOF10: 4

## 2022-11-30 ASSESSMENT — PAIN DESCRIPTION - LOCATION: LOCATION: BACK

## 2022-11-30 ASSESSMENT — PAIN DESCRIPTION - ORIENTATION: ORIENTATION: LOWER

## 2022-11-30 NOTE — PROGRESS NOTES
[facility]  Outpatient Physical Therapy    Treatment Note        Date: 2022  Patient: Wang Scales  : 1965   Confirmed: Yes  MRN: [id number]  Referring Provider: ALLA Starks CNP    Medical Diagnosis: Lumbar pain [M54.50]       Treatment Diagnosis: back pain, impaired mobility and tolerance    Visit Information:  Insurance: Payor: Ashlee Nolan / Plan: Alysha Truong RUT / Product Type: *No Product type* /   PT Visit Information  PT Insurance Information: Adrianna Sergeant  Total # of Visits Approved: 12  Total # of Visits to Date: 6  No Show: 0  Canceled Appointment: 0  Progress Note Counter:     Subjective Information:  Subjective: Pt presenting to appt today reporting increased soreness and strain in back w/ combination of PT and increased activity at home today. Pt reports taking out garbage, doing laundry, putting groceries away and cleaning up around the house prior to appt this evening. No noted relief w/ massage; heat provides most benefit. HEP Compliance:  [x] Good [] Fair [] Poor [] Reports not doing due to:    Pain Screening  Patient Currently in Pain: Yes  Pain Assessment: 0-10  Pain Level: 4  Pain Location: Back  Pain Orientation: Lower    Treatment:  Exercises:  Exercises  Exercise 1: LTR 10\"x10  Exercise 3: Seated QL stretch w/ Pball 10\"x5  Exercise 4: standing lumbar exts at counter x10 (hand support on counter)  Exercise 6: SLR x10 b/l  Exercise 7: Step ups*  Exercise 8: Standing hip flexor stretch (foot on 12\" box) 3x30\" b/l- to aid in lumbar ext mobility  Exercise 10: Nu step L4 x5 min with DLS  Exercise 12: seated hams str 330\", piriformis stretch (altnating knee up/down) 3x30\" b/l  Exercise 13: SKTC 30\"x3  Exercise 14: 3 way midback stretch 10\"x5 ea  Exercise 20: HEP: cont current  Treatment Reasoning  Limitations addressed:  Mobility, Strength, Flexibility, Activity tolerance, Posture    Manual:   Manual Therapy  Soft Tissue Mobilizaton: STM- pt tatiana mcgrath relief     Objective Measures:      Strength: [x] NT  [] MMT completed:     ROM: [x] NT  [] ROM measurements:     Assessment: Body Structures, Functions, Activity Limitations Requiring Skilled Therapeutic Intervention: Decreased functional mobility , Decreased ROM, Decreased body mechanics, Decreased strength, Increased pain, Decreased posture  Assessment: Tx focused on several stretches and mobility exs to ease LB tightness reported at arrival. Dicussed several tx options to address LBP including IDN, cupping, and KT. Pt expressing interest in cupping, will consider trialing NV upon edu of potential  benefit. Pt reports increased fatgue following sessions despite baseline program and avoidence of progressions today. Mild relief pre to post tx. Pt plans to utilize heating pad at home w/ edu on lumbar toll roll support in vehicle/chair etc.  Treatment Diagnosis: back pain, impaired mobility and tolerance  Therapy Prognosis: Good     Post-Pain Assessment:       Pain Rating (0-10 pain scale):   3/10   Location and pain description same as pre-treatment unless indicated. Action: [] NA   [x] Perform HEP  [] Meds as prescribed  [x] Modalities as prescribed   [] Call Physician     GOALS   Patient Goal(s): Patient Goals : help relieve back, hip and leg pain.  strengthen those areas    Short Term Goals Completed by 2 wks Goal Status   STG 1 Independent with HEP to promote home management of symptoms In progress   STG 2 Pt will report 25% improvement of symptoms with decreased difficulty donning socks and shoes In progress     Long Term Goals Completed by 6 wks Goal Status   LTG 1 Improve LE strength >/= 4+/5 to improve ease with stairs and yard work In progress   LTG 2 Improve SLR and lumbar ROM 5-10 degrees to improve ease with ADLs and housework In progress   LTG 3 Oswestry </= 6 to demonstrate improved overall activity tolerance In progress   LTG 4 Pt will demonstrate good understanding of proper body mechanics Plan:  Frequency/Duration:  Plan  Plan Frequency: 2  Plan weeks: 6  Specific Instructions for Next Treatment: Possible cupping  Current Treatment Recommendations: Strengthening, ROM, Functional mobility training, Endurance training, Gait training, Manual Therapy - Soft Tissue Mobilization, Home exercise program, Safety education & training, Patient/Caregiver education & training, Equipment evaluation, education, & procurement, Integrated dry needling, Modalities  Pt to continue current HEP. See objective section for any therapeutic exercise changes, additions or modifications this date.     Therapy Time:      PT Individual Minutes  Time In: 9694  Time Out: 7009  Minutes: 39  Timed Code Treatment Minutes: 39 Minutes  Procedure Minutes: N/A   Timed Activity Minutes Units   Ther Ex 39 3     Electronically signed by Rossy Alexander PTA on 11/30/22 at 5:02 PM EST

## 2022-12-05 ENCOUNTER — HOSPITAL ENCOUNTER (OUTPATIENT)
Dept: PHYSICAL THERAPY | Age: 57
Setting detail: THERAPIES SERIES
Discharge: HOME | End: 2022-12-05
Payer: COMMERCIAL

## 2022-12-05 PROCEDURE — 97035 APP MDLTY 1+ULTRASOUND EA 15: CPT

## 2022-12-05 PROCEDURE — 97110 THERAPEUTIC EXERCISES: CPT

## 2022-12-05 ASSESSMENT — PAIN DESCRIPTION - DESCRIPTORS: DESCRIPTORS: DULL

## 2022-12-05 ASSESSMENT — PAIN SCALES - GENERAL: PAINLEVEL_OUTOF10: 1

## 2022-12-05 ASSESSMENT — PAIN DESCRIPTION - LOCATION: LOCATION: BACK

## 2022-12-05 NOTE — PROGRESS NOTES
[address] [facility]  Outpatient Physical Therapy    Treatment Note        Date: 2022  Patient: Hawa Retana  : 1965   Confirmed: Yes  MRN: [id number]  Referring Provider: ALLA Rosales -*   Secondary Referring Provider (If applicable):     Medical Diagnosis: Lumbar pain [M54.50]    Treatment Diagnosis: back pain, impaired mobility and tolerance    Visit Information:  Insurance: Payor: Laura Starr / Plan: Abdiaziz Cornell RUT / Product Type: *No Product type* /   PT Visit Information  Onset Date: 22  PT Insurance Information: Samuel Ferris  Total # of Visits Approved: 12  Total # of Visits to Date: 7  No Show: 0  Canceled Appointment: 0  Progress Note Counter:     Subjective Information:  Subjective: Pt states continued difficulty with initial movement after prolonged sitting. States still \"grabbing\" x3-4 minutes then relief. Pt reports exercises seem to be helping, but still deficits. Pt reports 20% improvement. Pt reports concerns with constant feelings of fatigue and weakness in both LEs. HEP Compliance:  [x] Good [] Fair [] Poor [x] Reports not doing due to: states doing them when he is able and not all of them every day. Discussed spacing them out, doing stretches consistently for best results.      Pain Screening  Pain Level: 1  Pain Location: Back  Pain Descriptors: Dull    Treatment:  Exercises:  Exercises  Exercise 4: prone lying with increased discomfort, able to tolerate better with pillow under hips  Exercise 8: standing hip flexor str 30 sec/3 against wall  Exercise 10: Sci Fit x1 min with increased symptoms  Exercise 12: seated hams str 3/30\", piriformis stretch (altnating knee up/down) 3x30\" b/l - modified piriofrmis with improved tolerance  Exercise 16: rocker board x30 lateral without increased symptoms with VCs for upright posture  Exercise 17: gait drills: lateral, retro without increased symptoms, march and monster steps with increased symptoms  Exercise 20: HEP: prone lying or supine extension  Treatment Reasoning  Limitations addressed: Mobility, Strength, Flexibility, Activity tolerance, Posture    Manual:    Attempted STM with increased tenderness. Tennis ball massage to lumbar paraspinals. Modalities:  Ultrasound (CPT 38730)  Patient Position: L sidelying  Ultrasound location: Right, Hip  Ultrasound specified location: greater trochanter  Ultrasound frequency: 1 MHz  Ultrasound intensity (W/cm2): 1.2  Ultrasound mode: Continuous       *Indicates exercise, modality, or manual techniques to be initiated when appropriate    Objective Measures:      Strength: [] NT  [] MMT completed:  Strength RLE  R Hip Flexion: 4/5, 4+/5  R Hip Internal Rotation: 4+/5  R Hip External Rotation: 4/5  R Knee Flexion: 4+/5  R Knee Extension: 5/5  Strength LLE  L Hip External Rotation: 4+/5  L Knee Flexion: 4+/5  L Knee Extension: 5/5  L Ankle Dorsiflexion: 5/5     Assessment: Body Structures, Functions, Activity Limitations Requiring Skilled Therapeutic Intervention: Decreased functional mobility , Decreased ROM, Decreased body mechanics, Decreased strength, Increased pain, Decreased posture  Assessment: Pt concerned with continued feelings of weakness and fatigue. Advised pt to discuss with physician. Discussed options to increase overall activity level to determine if able to increase endurance/ stamina/ strength. Modified stretches to decrease \"pinching\" sensation Rt posterior gr trochanteric area. Initiated US to decrease pain and inflammation Rt gr troch. Improving strength per MMT  Treatment Diagnosis: back pain, impaired mobility and tolerance             Post-Pain Assessment:       Pain Rating (0-10 pain scale):   1/10   Location and pain description same as pre-treatment unless indicated.    Action: [x] NA   [] Perform HEP  [] Meds as prescribed  [] Modalities as prescribed   [] Call Physician     GOALS   Patient Goal(s): Patient Goals : help relieve back, hip and leg pain. strengthen those areas    Short Term Goals Completed by 2 wks Goal Status   STG 1 Independent with HEP to promote home management of symptoms In progress   STG 2 Pt will report 25% improvement of symptoms with decreased difficulty donning socks and shoes In progress     Long Term Goals Completed by 6 wks Goal Status   LTG 1 Improve LE strength >/= 4+/5 to improve ease with stairs and yard work In progress   LTG 2 Improve SLR and lumbar ROM 5-10 degrees to improve ease with ADLs and housework In progress   LTG 3 Oswestry </= 6 to demonstrate improved overall activity tolerance In progress   LTG 4 Pt will demonstrate good understanding of proper body mechanics       Plan:  Frequency/Duration:  Plan  Plan Frequency: 2  Plan weeks: 6  Specific Instructions for Next Treatment: Possible cupping  Pt to continue current HEP. See objective section for any therapeutic exercise changes, additions or modifications this date.     Therapy Time:      PT Individual Minutes  Time In: 1301  Time Out: 1350  Minutes: 49  Timed Code Treatment Minutes: 49 Minutes  Procedure Minutes:0    Timed Activity Minutes Units   Ther Ex 33 2   US  8 1   Manual  8 0     Electronically signed by Shanda Hron PT on 12/5/22 at 1:52 PM EST

## 2022-12-07 ENCOUNTER — HOSPITAL ENCOUNTER (OUTPATIENT)
Dept: PHYSICAL THERAPY | Age: 57
Setting detail: THERAPIES SERIES
Discharge: HOME | End: 2022-12-07
Payer: COMMERCIAL

## 2022-12-07 PROCEDURE — 97110 THERAPEUTIC EXERCISES: CPT

## 2022-12-07 PROCEDURE — 97035 APP MDLTY 1+ULTRASOUND EA 15: CPT

## 2022-12-07 NOTE — PROGRESS NOTES
[facility]  Outpatient Physical Therapy    Treatment Note        Date: 2022  Patient: Wang Scales  : 1965   Confirmed: Yes  MRN: [id number]  Referring Provider: ALLA Starks CNP    Medical Diagnosis: Lumbar pain [M54.50]       Treatment Diagnosis: back pain, impaired mobility and tolerance    Visit Information:  Insurance: Payor: Ashlee Nolan / Plan: Alysha Chilodago RUT / Product Type: *No Product type* /   PT Visit Information  Onset Date: 22  PT Insurance Information: Adrianna Sergeant  Total # of Visits Approved: 12  Total # of Visits to Date: 8  No Show: 0  Canceled Appointment: 0  Progress Note Counter:     Subjective Information:  Subjective: Pt states \"I feel good today. It felt better after that machine, it's seemed to help. It used to hurt all the time but now it's only when I stretch it. \" Pt noted standing up from chair after prolonged sitting is less uncomfortable. HEP Compliance:  [x] Good [] Fair [] Poor [] Reports not doing due to:    Pain Screening  Patient Currently in Pain: Denies    Treatment:  Exercises:  Exercises  Exercise 5: DKTC w/ Pball 5-10\"x15, Pball bridges*  Exercise 7: Step ups Fwd x10 b/l  Exercise 8: standing hip flexor str 30 sec/3 against wall  Exercise 10: Sci Fit L5 x1 min with increased symptoms  Exercise 12: seated hams str 3/30\", piriformis stretch (altnating knee up/down) 3x30\" b/l - modified piriofrmis with improved tolerance  Exercise 20: HEP: cont current  Treatment Reasoning  Limitations addressed:  Mobility, Strength, Flexibility, Activity tolerance, Posture    Modalities:  Ultrasound (CPT 35297)  Patient Position: L sidelying  Ultrasound location: Right, Hip  Ultrasound specified location: greater trochanter  Ultrasound frequency: 1 MHz  Ultrasound intensity (W/cm2): 1.2  Ultrasound mode: Continuous     *Indicates exercise, modality, or manual techniques to be initiated when appropriate    Objective Measures:       Strength: [x] NT  [] MMT completed:     ROM: [x] NT  [] ROM measurements:      Assessment: Body Structures, Functions, Activity Limitations Requiring Skilled Therapeutic Intervention: Decreased functional mobility , Decreased ROM, Decreased body mechanics, Decreased strength, Increased pain, Decreased posture  Assessment: Exs and stretches continued to address limitations in lumbar/hip mobility. Step up activity tolerated well within samall rep range. Pt continues to note discomfort w/ Rt piriformis stretch req pt to complete through reduced range and alterred positioning to avoid pain. Continued US tx to Rt post gr trochanter as pt noting lasting relief after LV. Treatment Diagnosis: back pain, impaired mobility and tolerance  Therapy Prognosis: Good    Post-Pain Assessment:       Pain Rating (0-10 pain scale):   0/10   Location and pain description same as pre-treatment unless indicated. Action: [x] NA   [] Perform HEP  [] Meds as prescribed  [] Modalities as prescribed   [] Call Physician     GOALS   Patient Goal(s): Patient Goals : help relieve back, hip and leg pain. strengthen those areas    Short Term Goals Completed by 2 wks Goal Status   STG 1 Independent with HEP to promote home management of symptoms In progress   STG 2 Pt will report 25% improvement of symptoms with decreased difficulty donning socks and shoes In progress     Long Term Goals Completed by 6 wks Goal Status   LTG 1 Improve LE strength >/= 4+/5 to improve ease with stairs and yard work In progress   LTG 2 Improve SLR and lumbar ROM 5-10 degrees to improve ease with ADLs and housework In progress   LTG 3 Oswestry </= 6 to demonstrate improved overall activity tolerance In progress   LTG 4 Pt will demonstrate good understanding of proper body mechanics       Plan:  Frequency/Duration:  Plan  Plan Frequency: 2  Plan weeks: 6  Pt to continue current HEP. See objective section for any therapeutic exercise changes, additions or modifications this date.     Therapy Time:      PT Individual Minutes  Time In: 1435  Time Out: 4557  Minutes: 38  Timed Code Treatment Minutes: 38 Minutes  Procedure Minutes: N/A   Timed Activity Minutes Units   Ther Ex 30 2   US 8 1     Electronically signed by Sweta Bansal PTA on 12/7/22 at 3:30 PM EST

## 2022-12-12 ENCOUNTER — APPOINTMENT (OUTPATIENT)
Dept: PHYSICAL THERAPY | Age: 57
End: 2022-12-12
Payer: COMMERCIAL

## 2022-12-13 ENCOUNTER — HOSPITAL ENCOUNTER (OUTPATIENT)
Dept: PHYSICAL THERAPY | Age: 57
Setting detail: THERAPIES SERIES
Discharge: HOME | End: 2022-12-13
Payer: COMMERCIAL

## 2022-12-13 PROCEDURE — 97110 THERAPEUTIC EXERCISES: CPT

## 2022-12-13 NOTE — PROGRESS NOTES
[address] [facility]  Outpatient Physical Therapy    Treatment Note        Date: 2022  Patient: Amy Crandall  : 1965   Confirmed: Yes  MRN: [id number]  Referring Provider: ALLA Holm -*   Secondary Referring Provider (If applicable):     Medical Diagnosis: Lumbar pain [M54.50]    Treatment Diagnosis: back pain, impaired mobility and tolerance    Visit Information:  Insurance: Payor: Sonia Flannery / Plan: Kailee Suresh RUT / Product Type: *No Product type* /   PT Visit Information  Onset Date: 22  PT Insurance Information: Niki Kenan  Total # of Visits Approved: 12  Total # of Visits to Date: 9  No Show: 0  Canceled Appointment: 0  Progress Note Counter:     Subjective Information:     HEP Compliance:  [x] Good []  [address] [] Poor [] Reports not doing due to:    Pain Screening  Patient Currently in Pain: Denies    Treatment:  Exercises:  Exercises  Exercise 5: DKTC w/ Pball 5-10\"x15, Pball bridges x10  Exercise 7: Step ups Fwd /lateral x10 b/l  Exercise 8: standing hip flexor str 30 sec/3 against wall  Exercise 10: NuStep [id number] without increase symptoms  Exercise 12: seated hams str 3/30\", piriformis stretch (altnating knee up/down) 3x30\" b/l - modified piriofrmis with improved tolerance  Exercise 20: HEP: cont current  Treatment Reasoning  Limitations addressed: Mobility, Strength, Flexibility, Activity tolerance, Posture      Modalities:  Ultrasound (CPT 76588)  Patient Position: L sidelying  Ultrasound location: Right, Hip  Ultrasound specified location: greater trochanter  Ultrasound frequency: 1 MHz  Ultrasound intensity (W/cm2): 1.2  Ultrasound mode: Continuous            Assessment:    Body Structures, Functions, Activity Limitations Requiring Skilled Therapeutic Intervention: Decreased functional mobility , Decreased ROM, Decreased body mechanics, Decreased strength, Increased pain, Decreased posture  Assessment: Continued current exercise program to progress areas of limited weakness. Pt performs forward and lateral step ups with rest break after each set secondary to fatigue in right hip. US tx to Rt post gr trochanter per pt request due to relief, pt positioned in s/l with c/o discomfort in right lateral leg. Tx discontinued and pt returned to sitting without improvement in symptoms. Pt states it may be related to fatigue felt earlier in treatment. Treatment Diagnosis: back pain, impaired mobility and tolerance  Therapy Prognosis: Good       Post-Pain Assessment:       Pain Rating (0-10 pain scale):   0/10   Location and pain description same as pre-treatment unless indicated. Action: [x] NA   [] Perform HEP  [] Meds as prescribed  [] Modalities as prescribed   [] Call Physician     GOALS   Patient Goal(s): Patient Goals : help relieve back, hip and leg pain. strengthen those areas    Short Term Goals Completed by 2 wks Goal Status   STG 1 Independent with HEP to promote home management of symptoms In progress   STG 2 Pt will report 25% improvement of symptoms with decreased difficulty donning socks and shoes In progress       Long Term Goals Completed by 6 wks Goal Status   LTG 1 Improve LE strength >/= 4+/5 to improve ease with stairs and yard work In progress   LTG 2 Improve SLR and lumbar ROM 5-10 degrees to improve ease with ADLs and housework In progress   LTG 3 Oswestry </= 6 to demonstrate improved overall activity tolerance In progress   LTG 4 Pt will demonstrate good understanding of proper body mechanics  In progress            Plan:  Frequency/Duration:  Plan  Plan Frequency: 2  Plan weeks: 6  Pt to continue current HEP. See objective section for any therapeutic exercise changes, additions or modifications this date.     Therapy Time:      PT Individual Minutes  Time In: 2828  Time Out: 6798  Minutes: 42  Timed Code Treatment Minutes: 42 Minutes  Procedure Minutes:0  Timed Activity Minutes Units   Ther Ex 37 3   Manual  5 0 Electronically signed by Kwabena Mejia PTA on 12/13/22 at 3:18 PM EST

## 2022-12-15 ENCOUNTER — HOSPITAL ENCOUNTER (OUTPATIENT)
Dept: PHYSICAL THERAPY | Age: 57
Setting detail: THERAPIES SERIES
Discharge: HOME | End: 2022-12-15
Payer: COMMERCIAL

## 2022-12-15 PROCEDURE — 97110 THERAPEUTIC EXERCISES: CPT

## 2022-12-15 ASSESSMENT — PAIN DESCRIPTION - DESCRIPTORS: DESCRIPTORS: DULL

## 2022-12-15 ASSESSMENT — PAIN DESCRIPTION - ORIENTATION: ORIENTATION: RIGHT

## 2022-12-15 ASSESSMENT — PAIN DESCRIPTION - FREQUENCY: FREQUENCY: CONTINUOUS

## 2022-12-15 ASSESSMENT — PAIN DESCRIPTION - LOCATION: LOCATION: HIP

## 2022-12-15 ASSESSMENT — PAIN SCALES - GENERAL: PAINLEVEL_OUTOF10: 1

## 2022-12-15 NOTE — PROGRESS NOTES
[facility]  Outpatient Physical Therapy    Treatment Note        Date: 12/15/2022  Patient: Niki Zelaya  : 1965   Confirmed: Yes  MRN: [id number]  Referring Provider: ALLA Mi CNP    Medical Diagnosis: Lumbar pain [M54.50]       Treatment Diagnosis: back pain, impaired mobility and tolerance    Visit Information:  Insurance: Payor: Josiah Rule / Plan: Marandres Hectorpepito RUT / Product Type: *No Product type* /   PT Visit Information  Onset Date: 22  PT Insurance Information: Melvin Talamantes  Total # of Visits Approved: 12  Total # of Visits to Date: 10  No Show: 0  Canceled Appointment: 0  Progress Note Counter: 10/12    Subjective Information:  Subjective: Pt noted pain subsiding after ~30 min following last therapy session though did note more soreness w/ lat hip strengthening. Pt reporting 1/10 pain level currently. HEP Compliance:  [x] Good [] Fair [] Poor [] Reports not doing due to:    Pain Screening  Pain Assessment: 0-10  Pain Level: 1  Pain Location: Hip  Pain Orientation: Right  Pain Descriptors: Dull  Pain Frequency: Continuous    Treatment:  Exercises:  Exercises  Exercise 7: Step ups Fwd w/ contralateral march, lateral x10 b/l (\"6\" step)  Exercise 8: standing hip flexor str 30 sec/3 on step  Exercise 9: ITB str in standing 3x30\"  b/l  Exercise 10: NuStep [id number] without increase symptoms  Exercise 11: Lat monster walks 20'x2 YTB  Exercise 12: seated hams str 3/30\", piriformis stretch (altnating knee up/down) 3x30\" b/l - modified piriofrmis with improved tolerance  Exercise 13: SLS 30\" b/l  Exercise 20: HEP: cont current+SLS and monster walks  Treatment Reasoning  Limitations addressed: Mobility, Strength, Flexibility, Activity tolerance, Posture    Objective Measures:      Strength: [x] NT  [] MMT completed:     ROM: [x] NT  [] ROM measurements:     Assessment:    Body Structures, Functions, Activity Limitations Requiring Skilled Therapeutic Intervention: Decreased functional mobility , Decreased ROM, Decreased body mechanics, Decreased strength, Increased pain, Decreased posture  Assessment: Cont'd to address hip strengthening and SL stability to ease stair ability and general activity gilbert. Pt w/ generalized muscle soreness/fatigue noted w/ ex activity vs Lt hip pain this date. Pt opting to hold from continuation of US to assess response D/T inc pain during tx LV. Therapist recommending use of ice as exercise and activity levels progress. Pt denies inc in pain pre to post tx. Treatment Diagnosis: back pain, impaired mobility and tolerance  Therapy Prognosis: Good     Post-Pain Assessment:       Pain Rating (0-10 pain scale):   1/10   Location and pain description same as pre-treatment unless indicated. Action: [] NA   [x] Perform HEP  [] Meds as prescribed  [x] Modalities as prescribed   [] Call Physician     GOALS   Patient Goal(s): Patient Goals : help relieve back, hip and leg pain. strengthen those areas    Short Term Goals Completed by 2 wks Goal Status   STG 1 Independent with HEP to promote home management of symptoms In progress   STG 2 Pt will report 25% improvement of symptoms with decreased difficulty donning socks and shoes In progress     Long Term Goals Completed by 6 wks Goal Status   LTG 1 Improve LE strength >/= 4+/5 to improve ease with stairs and yard work In progress   LTG 2 Improve SLR and lumbar ROM 5-10 degrees to improve ease with ADLs and housework In progress   LTG 3 Oswestry </= 6 to demonstrate improved overall activity tolerance In progress   LTG 4 Pt will demonstrate good understanding of proper body mechanics         Plan:  Frequency/Duration:  Plan  Plan Frequency: 2  Plan weeks: 6  Pt to continue current HEP. See objective section for any therapeutic exercise changes, additions or modifications this date.     Therapy Time:      PT Individual Minutes  Time In: 3707  Time Out: 1513  Minutes: 41  Timed Code Treatment Minutes: 41 Minutes  Procedure Minutes: N/A   Timed Activity Minutes Units   Ther Ex 41 3     Electronically signed by Sweta Bansal PTA on 12/15/22 at 3:20 PM EST

## 2022-12-19 ENCOUNTER — HOSPITAL ENCOUNTER (OUTPATIENT)
Dept: PHYSICAL THERAPY | Age: 57
Setting detail: THERAPIES SERIES
Discharge: HOME | End: 2022-12-19
Payer: COMMERCIAL

## 2022-12-19 PROCEDURE — 97110 THERAPEUTIC EXERCISES: CPT

## 2022-12-19 NOTE — PROGRESS NOTES
[facility]  PHYSICAL THERAPY PLAN OF CARE                                       [] Certification  [] Recertification []  Plan of Care  [x] Progress Note [] Discharge      Referring Provider: ALLA Avalos CNP     From:  Yesenia Lawson PT  Patient: Danilo Soto (85 y.o. male) : 1965 Date: 2022  Medical Diagnosis: Lumbar pain [M54.50]       Treatment Diagnosis: back pain, impaired mobility and tolerance    Progress Report Period from:  2022  to 2022  Visits to Date: 11 No Show: 0 Cancelled Appts: 0    OBJECTIVE:   Short Term Goals - Time Frame for Short Term Goals: 2 wks    Goals Current/Discharge status  Status   Short Term Goal 1: Independent with HEP to promote home management of symptoms  Indep/ compliant w/ current; ongoing Met   Short Term Goal 2: Pt will report 25% improvement of symptoms with decreased difficulty donning socks and shoes  Pt reports 25% improvement  Met     Long Term Goals - Time Frame for Long Term Goals : 6 wks  Goals Current/ Discharge status Status   Long Term Goal 1: Improve LE strength >/= 4+/5 to improve ease with stairs and yard work Strength RLE  R Hip Flexion: 4+/5, 4/5  R Hip Extension: 4-/5  R Hip ABduction: 4/5  R Hip Internal Rotation: 4+/5  R Hip External Rotation: 4/5  R Knee Flexion: 5/5  R Knee Extension: 5/5  R Ankle Dorsiflexion: 5/5  Strength LLE  L Hip Flexion: 4+/5  L Hip Extension: 4/5  L Hip ABduction: 4/5, 4+/5  L Hip Internal Rotation: 4+/5  L Hip External Rotation: 4+/5  L Knee Flexion: 5/5  L Knee Extension: 5/5  L Ankle Dorsiflexion: 5/5 In progress   Long Term Goal 2: Improve SLR and lumbar ROM 5-10 degrees to improve ease with ADLs and housework PROM RLE (degrees)  RLE General PROM: 78 deg (HS flexibility)  AROM RLE (degrees)  R Hip Extension (0-10): 30 deg (modified in standing)  PROM LLE (degrees)  LLE General PROM: 75 deg (HS flexibility)  AROM LLE (degrees)  L Hip Extension (0-10): 35 deg (modified in standing)    Spine  Lumbar: Flex 57 deg (pulling in HS's) , Ext 18 deg (no pain)  In progress   Long Term Goal 3: Oswestry </= 6 to demonstrate improved overall activity tolerance NT D/T clinical oversight  In progress   Long Term Goal 4: Pt will demonstrate good understanding of proper body mechanics Improving   In progress     Body Structures, Functions, Activity Limitations Requiring Skilled Therapeutic Intervention: Decreased functional mobility , Decreased ROM, Decreased body mechanics, Decreased strength, Increased pain, Decreased posture  Assessment: Pt has seen great progress in reduction of hip pain w/ most recent discomfort upon Rt S/L position vs activity ranging 0-1/10 at most. Cont'd to progress SL strengthening to include hip hikes and BOSU lunges performed w/ good gilbert. Discussed 1 visit remaining in current POC w/ pt expressing wish to cont through 12/29/22 appt. PN completed today in request for 2 additional visits in POC to assess response to ex progressions/increased activity as well as finalize HEP prior to D/C. Pt noted to have improvements in b/l LE strength, HS flexibility and lumbar ROM upon assessment w/ 25% improvement in ability to don socks/shoes. Therapy Prognosis: Good    PLAN:   Frequency/Duration:  Plan Frequency: 2  Plan weeks: 6  Current Treatment Recommendations: Strengthening, ROM, Functional mobility training, Endurance training, Manual, Home exercise program, Safety education & training, Patient/Caregiver education & training, Equipment evaluation, education, & procurement, Dry needling, Modalities  Modalities: Heat/Cold, E-stim - unattended, Ultrasound                  Patient Status:[x] Continue/ Initiate plan of Care    [] Discharge PT. Recommend pt continue with HEP.      [] Additional visits requested, Please re-certify for additional visits:    [] Hold         Signature: Objective information by: Electronically signed by Sheila Bah PTA on 12/19/22 at 5:06 PM EST    Electronically signed by Jody Giordano PT on 12/27/22 at 2:45 PM EST    If you have any questions or concerns, please don't hesitate to call.   Thank you for your referral.

## 2022-12-19 NOTE — PROGRESS NOTES
[facility]  Outpatient Physical Therapy    Treatment Note        Date: 2022  Patient: Joseph Nation  : 1965   Confirmed: Yes  MRN: [id number]  Referring Provider: ALLA Lindsey CNP    Medical Diagnosis: Lumbar pain [M54.50]       Treatment Diagnosis: back pain, impaired mobility and tolerance    Visit Information:  Insurance: Payor: Samina Borjas / Plan: Millie Mendez RUT / Product Type: *No Product type* /   PT Visit Information  Onset Date: 22  PT Insurance Information: Ninoska Sham  Total # of Visits Approved: 12  Total # of Visits to Date: 11  No Show: 0  Canceled Appointment: 0  Progress Note Counter:     Subjective Information:  Subjective: Pt reports \"Pain is very little and my back has been doing good. It's mostly the pressure when I lay on my side that bothers the hip. \"  HEP Compliance:  [x] Good [] Fair [] Poor [] Reports not doing due to:    Pain Screening  Patient Currently in Pain: Denies  Best Pain Level: 0  Worst Pain Level: 1    Treatment:  Exercises:  Exercises  Exercise 5: BOSU lunges x10 ea  b/l fwd/lat  Exercise 6: Hip hikes x10 b/l on step  Exercise 9: ITB str in standing 3x30\"  b/l  Exercise 10: NuStep [id number] without increase symptoms  Exercise 12: seated hams str 3/30\", piriformis stretch (altnating knee up/down) 3x30\" b/l - modified piriofrmis with improved tolerance     Manual:    Objective measures     Objective Measures:      Strength: [] NT  [x] MMT completed:  Strength RLE  R Hip Flexion: 4+/5, 4/5  R Hip Extension: 4-/5  R Hip ABduction: 4/5  R Hip Internal Rotation: 4+/5  R Hip External Rotation: 4/5  R Knee Flexion: 5/5  R Knee Extension: 5/5  R Ankle Dorsiflexion: 5/5  Strength LLE  L Hip Flexion: 4+/5  L Hip Extension: 4/5  L Hip ABduction: 4/5, 4+/5  L Hip Internal Rotation: 4+/5  L Hip External Rotation: 4+/5  L Knee Flexion: 5/5  L Knee Extension: 5/5  L Ankle Dorsiflexion: 5/5     ROM: [] NT  [x] ROM measurements:  AROM LLE (degrees)  L Hip Extension (0-10): 35 deg (modified in standing)   AROM RLE (degrees)  R Hip Extension (0-10): 30 deg (modified in standing)   PROM LLE (degrees)  LLE General PROM: 75 deg (HS flexibility)   PROM RLE (degrees)  RLE General PROM: 78 deg (HS flexibility)    Spine  Lumbar: Flex 57 deg (pulling in HS's) , Ext 18 deg (no pain)    Assessment: Body Structures, Functions, Activity Limitations Requiring Skilled Therapeutic Intervention: Decreased functional mobility , Decreased ROM, Decreased body mechanics, Decreased strength, Increased pain, Decreased posture  Assessment: Pt has seen great progress in reduction of hip pain w/ most recent discomfort upon Rt S/L position vs activity ranging 0-1/10 at most. Cont'd to progress SL strengthening to include hip hikes and BOSU lunges performed w/ good gilbert. Discussed 1 visit remaining in current POC w/ pt expressing wish to cont through 12/29/22 appt. PN completed today in request for 2 additional visits in POC to assess response to ex progressions/increased activity as well as finalize HEP prior to D/C. Pt noted to have improvements in b/l LE strength, HS flexibility and lumbar ROM upon assessment w/ 25% improvement in ability to don socks/shoes. Treatment Diagnosis: back pain, impaired mobility and tolerance  Therapy Prognosis: Good    Post-Pain Assessment:       Pain Rating (0-10 pain scale):   0/10   Location and pain description same as pre-treatment unless indicated. Action: [x] NA   [] Perform HEP  [] Meds as prescribed  [] Modalities as prescribed   [] Call Physician     GOALS   Patient Goal(s): Patient Goals : help relieve back, hip and leg pain.  strengthen those areas    Short Term Goals Completed by 2 wks Goal Status   STG 1 Independent with HEP to promote home management of symptoms In progress   STG 2 Pt will report 25% improvement of symptoms with decreased difficulty donning socks and shoes In progress     Long Term Goals Completed by 6 wks Goal Status   LTG 1 Improve LE strength >/= 4+/5 to improve ease with stairs and yard work In progress   LTG 2 Improve SLR and lumbar ROM 5-10 degrees to improve ease with ADLs and housework In progress   LTG 3 Oswestry </= 6 to demonstrate improved overall activity tolerance In progress   LTG 4 Pt will demonstrate good understanding of proper body mechanics       Plan:  Frequency/Duration:  Plan  Plan Frequency: 2  Plan weeks: 6  Pt to continue current HEP. See objective section for any therapeutic exercise changes, additions or modifications this date.     Therapy Time:      PT Individual Minutes  Time In: 2717  Time Out: 1600  Minutes: 45  Timed Code Treatment Minutes: 45 Minutes  Procedure Minutes: N/A   Timed Activity Minutes Units   Ther Ex 40 3   Manual  5 0     Electronically signed by Sheila Bah PTA on 12/19/22 at 5:04 PM EST

## 2022-12-22 ENCOUNTER — HOSPITAL ENCOUNTER (OUTPATIENT)
Dept: PHYSICAL THERAPY | Age: 57
Setting detail: THERAPIES SERIES
Discharge: HOME | End: 2022-12-22
Payer: COMMERCIAL

## 2022-12-22 PROCEDURE — 97110 THERAPEUTIC EXERCISES: CPT

## 2022-12-22 ASSESSMENT — PAIN DESCRIPTION - LOCATION: LOCATION: HIP

## 2022-12-22 ASSESSMENT — PAIN DESCRIPTION - FREQUENCY: FREQUENCY: CONTINUOUS

## 2022-12-22 ASSESSMENT — PAIN DESCRIPTION - ORIENTATION: ORIENTATION: RIGHT

## 2022-12-22 ASSESSMENT — PAIN DESCRIPTION - DESCRIPTORS: DESCRIPTORS: DULL

## 2022-12-22 ASSESSMENT — PAIN SCALES - GENERAL: PAINLEVEL_OUTOF10: 1

## 2022-12-22 NOTE — PROGRESS NOTES
[address] [facility] [address] [facility]  Outpatient Physical Therapy    Treatment Note        Date: 2022  Patient: Josef Aponte  : 1965   Confirmed: Yes  MRN: [id number]  Referring Provider: ALLA Weston -*   Secondary Referring Provider (If applicable):     Medical Diagnosis: Lumbar pain [M54.50]    Treatment Diagnosis: back pain, impaired mobility and tolerance    Visit Information:  Insurance: Payor: Ruthy Verduzco / Plan: Douglas Rehman RUT / Product Type: *No Product type* /   PT Visit Information  Onset Date: 22  PT Insurance Information: Ramirez Mckeon  Total # of Visits Approved: 12  Total # of Visits to Date: 12  No Show: 0  Canceled Appointment: 0  Progress Note Counter:     Subjective Information:  Subjective: Pt reports having onset of Rt LE pain from post/lat thigh into foot 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 Pain lingering though most located at hip jt. HEP Compliance:  [x] Good [] Fair [] Poor [] Reports not doing due to:    Pain Screening  Patient Currently in Pain: Yes  Pain Assessment: 0-10  Pain Level: 1  Pain Location: Hip  Pain Orientation: Right  Pain Descriptors: Dull  Pain Frequency: Continuous    Treatment:  Exercises:  Exercises  Exercise 4: TB 4 way hip w/ YTB x10 ea b/l w/ UE support  Exercise 5: Bridges w/ RTB 5\" x10, LE's elevated on Pball-trialed w/ inc LBP  Exercise 6: Clams x15 ea  (2 way) b/l  Exercise 7: STS from lowered mat x10 (abl to perform w/o UE support)  Exercise 9: ITB str in standing 3x30\"  b/l  Exercise 10: NuStep [id number] without increase symptoms  Exercise 12: standing hams str 3/30\", piriformis stretch (altnating knee up/down) 3x30\" b/l - modified piriofrmis with improved tolerance  Exercise 20: HEP: rev clams, 4 way TB hip, bridges w/ TB     Objective Measures:      Strength: [x] NT  [] MMT completed:     ROM: [x] NT  [] ROM measurements:     Assessment:    Body Structures, Functions, Activity Limitations Requiring Skilled Therapeutic Intervention: Decreased functional mobility , Decreased ROM, Decreased body mechanics, Decreased strength, Increased pain, Decreased posture  Assessment: Continued to progress hip strengthening in SL w/ 4 way TB hip to further build gilbert to WB activity inc walking, stair climbing etc; good gilbert within limited range. Addition of rev clams and bridges w/ TB to further address lat hip weakness. HEP provided to include ex advancements. Inc weakness/fatigue on Rt vs Lt reported though pain level remains 1/10 pre to post tx. Pt able to perform repeated STS's from low mat w/o UE assist or compensatory wt shift in which pt expresses is a big improvement. Pt wishing to cont 2 visits remaining on schedule next week w/ anticipated D/C D/T high copay and ins changing in upcoming year. Continuation vs D/C to be determined next week. Treatment Diagnosis: back pain, impaired mobility and tolerance  Therapy Prognosis: Good     Post-Pain Assessment:       Pain Rating (0-10 pain scale):   1/10   Location and pain description same as pre-treatment unless indicated. Action: [] NA   [x] Perform HEP  [] Meds as prescribed  [x] Modalities as prescribed   [] Call Physician     GOALS   Patient Goal(s): Patient Goals : help relieve back, hip and leg pain.  strengthen those areas    Short Term Goals Completed by 2 wks Goal Status   STG 1 Independent with HEP to promote home management of symptoms In progress   STG 2 Pt will report 25% improvement of symptoms with decreased difficulty donning socks and shoes In progress     Long Term Goals Completed by 6 wks Goal Status   LTG 1 Improve LE strength >/= 4+/5 to improve ease with stairs and yard work In progress   LTG 2 Improve SLR and lumbar ROM 5-10 degrees to improve ease with ADLs and housework In progress   LTG 3 Oswestry </= 6 to demonstrate improved overall activity tolerance In progress   LTG 4 Pt will demonstrate good understanding of proper body mechanics       Plan:  Frequency/Duration:  Plan  Plan Frequency: 2  Plan weeks: 6  Additional Comments: PN completed LV- pt wishing to complete remaining 2 visits scheduled  Pt to continue current HEP. See objective section for any therapeutic exercise changes, additions or modifications this date.     Therapy Time:      PT Individual Minutes  Time In: 5493  Time Out: 0417  Minutes: 39  Timed Code Treatment Minutes: 39 Minutes  Procedure Minutes: N/A   Timed Activity Minutes Units   Ther Ex 39 3     Electronically signed by Angela To PTA on 12/22/22 at 4:56 PM EST

## 2022-12-27 ENCOUNTER — HOSPITAL ENCOUNTER (OUTPATIENT)
Dept: PHYSICAL THERAPY | Age: 57
Setting detail: THERAPIES SERIES
Discharge: HOME | End: 2022-12-27
Payer: COMMERCIAL

## 2022-12-27 PROCEDURE — 97110 THERAPEUTIC EXERCISES: CPT

## 2022-12-27 NOTE — PROGRESS NOTES
[address] [facility]  Outpatient Physical Therapy    Treatment Note        Date: 2022  Patient: Josef Aponte  : 1965   Confirmed: Yes  MRN: [id number]  Referring Provider: ALLA Weston -*   Secondary Referring Provider (If applicable):     Medical Diagnosis: Lumbar pain [M54.50]    Treatment Diagnosis: back pain, impaired mobility and tolerance    Visit Information:  Insurance: Payor: Ruthy Verduzco / Plan: Douglas Rehman RUT / Product Type: *No Product type* /   PT Visit Information  Onset Date: 22  PT Insurance Information: Ramirez Mckeon  Total # of Visits Approved: 12  Total # of Visits to Date: 13  No Show: 0  Canceled Appointment: 0  Progress Note Counter:     Subjective Information:  Subjective: Pt states \"Nothing\" in terms of current hip pain. Pt reports shoveling snow today w/o complaint. HEP Compliance:  [x] Good [] Fair [] Poor [] Reports not doing due to:    Pain Screening  Patient Currently in Pain: Denies  Best Pain Level: 0  Worst Pain Level: 1    Treatment:  Exercises:  Exercises  Exercise 4: TB 4 way hip w/ YTB x10 ea b/l w/ UE support  Exercise 5: BW squats 2x10  Exercise 8: Paloff presses 2x10 20#, trunk rot 2x10 20#  Exercise 9: ITB str in standing 3x30\"  b/l  Exercise 10: NuStep [id number] without increase symptoms  Exercise 12: standing hams str 3/30\", piriformis stretch (altnating knee up/down) 3x30\" b/l - modified piriofrmis with improved tolerance  Exercise 20: HEP: paloff presses, trunk rot     Objective Measures:      Strength: [x] NT  [] MMT completed:     ROM: [x] NT  [] ROM measurements:      Assessment:    Body Structures, Functions, Activity Limitations Requiring Skilled Therapeutic Intervention: Decreased functional mobility , Decreased ROM, Decreased body mechanics, Decreased strength, Increased pain, Decreased posture  Assessment: Addition of paloff press and trunk rotation against light resistance to progress core stability/strength and HEP leading up to upcoming discharge. Good gilbert to exs w/ appropriare fatigue/soreness noted post tx. Pt verbalizes 50% overall improvement from initial hip/LBP and functional activity tolerance since start of PT. Pt expected to discharge from PT NV. Treatment Diagnosis: back pain, impaired mobility and tolerance  Therapy Prognosis: Good     Post-Pain Assessment:       Pain Rating (0-10 pain scale):   1/10   Location and pain description same as pre-treatment unless indicated. Action: [] NA   [x] Perform HEP  [] Meds as prescribed  [x] Modalities as prescribed   [] Call Physician     GOALS   Patient Goal(s): Patient Goals : help relieve back, hip and leg pain. strengthen those areas    Short Term Goals Completed by 2 wks Goal Status   STG 1 Independent with HEP to promote home management of symptoms In progress   STG 2 Pt will report 25% improvement of symptoms with decreased difficulty donning socks and shoes In progress     Long Term Goals Completed by 6 wks Goal Status   LTG 1 Improve LE strength >/= 4+/5 to improve ease with stairs and yard work In progress   LTG 2 Improve SLR and lumbar ROM 5-10 degrees to improve ease with ADLs and housework In progress   LTG 3 Oswestry </= 6 to demonstrate improved overall activity tolerance In progress   LTG 4 Pt will demonstrate good understanding of proper body mechanics       Plan:  Frequency/Duration:  Plan  Plan Frequency: 2  Plan weeks: 6  Specific Instructions for Next Treatment: D/C NV  Pt to continue current HEP. See objective section for any therapeutic exercise changes, additions or modifications this date.     Therapy Time:      PT Individual Minutes  Time In: 3410  Time Out: 9526  Minutes: 38  Timed Code Treatment Minutes: 38 Minutes  Procedure Minutes: N/A   Timed Activity Minutes Units   Ther Ex 38 3     Electronically signed by Angela To PTA on 12/27/22 at 3:59 PM EST

## 2022-12-29 ENCOUNTER — HOSPITAL ENCOUNTER (OUTPATIENT)
Dept: PHYSICAL THERAPY | Age: 57
Setting detail: THERAPIES SERIES
Discharge: HOME | End: 2022-12-29
Payer: COMMERCIAL

## 2022-12-29 PROCEDURE — 97110 THERAPEUTIC EXERCISES: CPT

## 2022-12-29 PROCEDURE — 97140 MANUAL THERAPY 1/> REGIONS: CPT

## 2022-12-29 ASSESSMENT — PAIN DESCRIPTION - LOCATION: LOCATION: HIP

## 2022-12-29 ASSESSMENT — PAIN DESCRIPTION - FREQUENCY: FREQUENCY: CONTINUOUS

## 2022-12-29 ASSESSMENT — PAIN DESCRIPTION - ORIENTATION: ORIENTATION: RIGHT

## 2022-12-29 NOTE — PROGRESS NOTES
[address] [facility] [address] [facility]  Outpatient Physical Therapy    Treatment Note        Date: 2022  Patient: Danilo Soto  : 1965   Confirmed: Yes  MRN: [id number]  Referring Provider: ALLA Avalos -*   Secondary Referring Provider (If applicable):     Medical Diagnosis: Lumbar pain [M54.50]    Treatment Diagnosis: back pain, impaired mobility and tolerance    Visit Information:  Insurance: Payor: Danish Elias / Plan: Rolf Alcala RUT / Product Type: *No Product type* /   PT Visit Information  Onset Date: 22  PT Insurance Information: Brigida Simmons  Total # of Visits Approved: 12  Total # of Visits to Date: 14  No Show: 0  Canceled Appointment: 0  Progress Note Counter:     Subjective Information:  Subjective: Pt states \"Nothing\" in terms of current hip pain. Pt states \"Walking has been a lot better and less pain. The bending is still hard at times but I know it's because of the arthritis. This is the best I've felt in the last 6 months, since . \"  HEP Compliance:  [x] Good [] Fair [] Poor [] Reports not doing due to:    Pain Screening  Patient Currently in Pain: Denies  Best Pain Level: 0  Worst Pain Level: 1  Pain Location: Hip  Pain Orientation: Right  Pain Frequency: Continuous    Treatment:  Exercises:  Exercises  Exercise 1: MMT/ROM measures for D/C  Exercise 2: Review of HEP  Exercise 3: Assessment of body/lifting mechanics w/ 10# crate  Exercise 9: ITB str in standing 3x30\"  b/l  Exercise 10: NuStep [id number] without increase symptoms  Exercise 12: standing hams and hip flexor str 3/30\",  Exercise 20: HEP: cont current    Manual:   Objective measures completed     Objective Measures:     Strength: [] NT  [x] MMT completed:  Strength RLE  R Hip Flexion: 5/5  R Hip Extension: 4/5  R Hip ABduction: 4+/5  R Hip Internal Rotation: 4+/5  R Hip External Rotation: 4+/5  R Knee Flexion: 5/5  R Knee Extension: 5/5  R Ankle Dorsiflexion: 5/5  Strength LLE  L Hip Flexion: 5/5  L Hip Extension: 4/5  L Hip ABduction: 4+/5  L Hip Internal Rotation: 5/5  L Hip External Rotation: 5/5  L Knee Flexion: 5/5  L Knee Extension: 5/5  L Ankle Dorsiflexion: 5/5    ROM: [] NT  [x] ROM measurements:     AROM LLE (degrees)  L Hip Extension (0-10): 35 deg (modified in standing)   AROM RLE (degrees)  R Hip Extension (0-10): 30 deg (modified in standing), hip flex 125 deg, ABD 44 deg   PROM LLE (degrees)  LLE General PROM: 75 deg (HS flexibility)   PROM RLE (degrees)  RLE General PROM: 78 deg (HS flexibility)    Spine  Lumbar: Flex 57 deg (pulling in HS's) , Ext 18 deg (no pain)  Spine  Lumbar: Flex 57 deg (pulling in HS's) , Ext 18 deg (no pain)     Assessment: Body Structures, Functions, Activity Limitations Requiring Skilled Therapeutic Intervention: Decreased functional mobility , Decreased ROM, Decreased body mechanics, Decreased strength, Increased pain, Decreased posture  Assessment: Pt discharging after total of 14 visits of skilled PT completed. Pt very satisfied w/ outcome of PT verbalizing 90% improvement in both Rt hip pain and function. HEP has been gradually progressed to include mobility and strengthening exs w/ encouragement to remain compliant beyond D/C. Spent time assessing body/lifting mechanics to ensure reduced strain to b/l knees, LB and hips upon daily activity; good carryover noted. With exception of DARRYN score and cont'd limitation in Rt hip ext mobility, all goals have been met. Pt not expected to F/U w/ referring MD until 2/20/23 upon routine 6 month check up. Treatment Diagnosis: back pain, impaired mobility and tolerance  Therapy Prognosis: Good     Post-Pain Assessment:       Pain Rating (0-10 pain scale):   0/10   Location and pain description same as pre-treatment unless indicated. Action: [x] NA   [] Perform HEP  [] Meds as prescribed  [] Modalities as prescribed   [] Call Physician     GOALS   Patient Goal(s): Patient Goals : help relieve back, hip and leg pain. strengthen those areas    Short Term Goals Completed by 2 wks Goal Status   STG 1 Independent with HEP to promote home management of symptoms Met   STG 2 Pt will report 25% improvement of symptoms with decreased difficulty donning socks and shoes Met     Long Term Goals Completed by 6 wks Goal Status   LTG 1 Improve LE strength >/= 4+/5 to improve ease with stairs and yard work Partially met   LTG 2 Improve SLR and lumbar ROM 5-10 degrees to improve ease with ADLs and housework Partially met   LTG 3 Oswestry </= 6 to demonstrate improved overall activity tolerance Not Met   LTG 4 Pt will demonstrate good understanding of proper body mechanics       Plan:  Frequency/Duration:  Plan  Additional Comments: D/C this date. Pt to continue current HEP. See objective section for any therapeutic exercise changes, additions or modifications this date.     Therapy Time:      PT Individual Minutes  Time In: 7295  Time Out: 1550  Minutes: 32  Timed Code Treatment Minutes: 32 Minutes  Procedure Minutes: N/A  Timed Activity Minutes Units   Ther Ex 17 1   Manual  15 1     Electronically signed by Kelley Guzmán PTA on 12/29/22 at 3:59 PM EST

## 2022-12-29 NOTE — PROGRESS NOTES
[address]     Ph: [phone]  Fax: [phone]      [] Certification  [] Recertification []  Plan of Care  [] Progress Note [x] Discharge      Referring Provider: ALLA Mccormack CNP     From:  Brittni Olsen, PT  Patient: Dedrick Paulino (39 y.o. male) : 1965 Date: 2022  Medical Diagnosis: Lumbar pain [M54.50]       Treatment Diagnosis: back pain, impaired mobility and tolerance    Plan of Care/Certification Expiration Date: :     Progress Report Period from:  2022  to 2022    Visits to Date: 14 No Show: 0 Cancelled Appts: 0    OBJECTIVE:   Short Term Goals - Time Frame for Short Term Goals: 2 wks    Goals Current/Discharge status  Status   Short Term Goal 1: Independent with HEP to promote home management of symptoms  Indep/compliant  Met   Short Term Goal 2: Pt will report 25% improvement of symptoms with decreased difficulty donning socks and shoes  Pt reports 90% improvement  Met     Long Term Goals - Time Frame for Long Term Goals : 6 wks  Goals Current/ Discharge status Status   Long Term Goal 1: Improve LE strength >/= 4+/5 to improve ease with stairs and yard work Strength RLE  R Hip Flexion: 5/5  R Hip Extension: 4/5  R Hip ABduction: 4+/5  R Hip Internal Rotation: 4+/5  R Hip External Rotation: 4+/5  R Knee Flexion: 5/5  R Knee Extension: 5/5  R Ankle Dorsiflexion: 5/5  Strength LLE  L Hip Flexion: 5/5  L Hip Extension: 4/5  L Hip ABduction: 4+/5  L Hip Internal Rotation: 5/5  L Hip External Rotation: 5/5  L Knee Flexion: 5/5  L Knee Extension: 5/5  L Ankle Dorsiflexion: 5/5 Partially met   Long Term Goal 2: Improve SLR and lumbar ROM 5-10 degrees to improve ease with ADLs and housework PROM RLE (degrees)  RLE General PROM: 78 deg (HS flexibility)  AROM RLE (degrees)  R Hip Extension (0-10): 30 deg (modified in standing), hip flex 125 deg, ABD 44 deg  PROM LLE (degrees)  LLE General PROM: 75 deg (HS flexibility)  AROM LLE (degrees)  L Hip Extension (0-10): 35 deg (modified in standing)     Spine  Lumbar: Flex 57 deg (pulling in HS's) , Ext 18 deg (no pain)  Partially met   Long Term Goal 3: Oswestry </= 6 to demonstrate improved overall activity tolerance 12/50 Not Met   Long Term Goal 4: Pt will demonstrate good understanding of proper body mechanics Pt demo's good understanding of body/lifting mechanics; assessed w/ 10# lift from floor          Body Structures, Functions, Activity Limitations Requiring Skilled Therapeutic Intervention: Decreased functional mobility , Decreased ROM, Decreased body mechanics, Decreased strength, Increased pain, Decreased posture  Assessment: Pt discharging after total of 14 visits of skilled PT completed. Pt very satisfied w/ outcome of PT verbalizing 90% improvement in both Rt hip pain and function. Rt hip pain has maintained 0-1/10 level at most w/ all recent activity according to pt. HEP has been gradually progressed to include mobility and strengthening exs w/ encouragement to remain compliant beyond D/C. Pt not expected to F/U w/ referring MD until 2/20/23 upon routine 6 month check up. Therapy Prognosis: Good    PLAN:   Additional Comments: D/C this date. Patient Status:[] Continue/ Initiate plan of Care    [x] Discharge PT. Recommend pt continue with HEP. [] Additional visits requested, Please re-certify for additional visits:    [] Hold         Signature: Electronically signed by Roberto Reyez PTA on 12/29/22 at 4:07 PM EST    If you have any questions or concerns, please don't hesitate to call. Thank you for your referral.    I have reviewed this plan of care and certify a need for medically necessary rehabilitation services.     Physician Signature:__________________________________________________________  Date:  Please sign and return

## 2023-02-20 ENCOUNTER — OFFICE VISIT (OUTPATIENT)
Dept: FAMILY MEDICINE CLINIC | Age: 58
End: 2023-02-20
Payer: COMMERCIAL

## 2023-02-20 VITALS
DIASTOLIC BLOOD PRESSURE: 70 MMHG | OXYGEN SATURATION: 100 % | WEIGHT: 205 LBS | HEIGHT: 76 IN | BODY MASS INDEX: 24.96 KG/M2 | HEART RATE: 68 BPM | SYSTOLIC BLOOD PRESSURE: 124 MMHG

## 2023-02-20 DIAGNOSIS — R19.5 POSITIVE COLORECTAL CANCER SCREENING USING COLOGUARD TEST: ICD-10-CM

## 2023-02-20 DIAGNOSIS — R97.20 ELEVATED PSA: ICD-10-CM

## 2023-02-20 DIAGNOSIS — R53.83 OTHER FATIGUE: ICD-10-CM

## 2023-02-20 DIAGNOSIS — E78.00 HYPERCHOLESTEROLEMIA: ICD-10-CM

## 2023-02-20 DIAGNOSIS — I10 ESSENTIAL HYPERTENSION: Primary | ICD-10-CM

## 2023-02-20 PROCEDURE — 3017F COLORECTAL CA SCREEN DOC REV: CPT | Performed by: NURSE PRACTITIONER

## 2023-02-20 PROCEDURE — 1036F TOBACCO NON-USER: CPT | Performed by: NURSE PRACTITIONER

## 2023-02-20 PROCEDURE — G8427 DOCREV CUR MEDS BY ELIG CLIN: HCPCS | Performed by: NURSE PRACTITIONER

## 2023-02-20 PROCEDURE — 3074F SYST BP LT 130 MM HG: CPT | Performed by: NURSE PRACTITIONER

## 2023-02-20 PROCEDURE — G8482 FLU IMMUNIZE ORDER/ADMIN: HCPCS | Performed by: NURSE PRACTITIONER

## 2023-02-20 PROCEDURE — G8420 CALC BMI NORM PARAMETERS: HCPCS | Performed by: NURSE PRACTITIONER

## 2023-02-20 PROCEDURE — 99214 OFFICE O/P EST MOD 30 MIN: CPT | Performed by: NURSE PRACTITIONER

## 2023-02-20 PROCEDURE — 3078F DIAST BP <80 MM HG: CPT | Performed by: NURSE PRACTITIONER

## 2023-02-20 SDOH — ECONOMIC STABILITY: FOOD INSECURITY: WITHIN THE PAST 12 MONTHS, YOU WORRIED THAT YOUR FOOD WOULD RUN OUT BEFORE YOU GOT MONEY TO BUY MORE.: NEVER TRUE

## 2023-02-20 SDOH — ECONOMIC STABILITY: HOUSING INSECURITY
IN THE LAST 12 MONTHS, WAS THERE A TIME WHEN YOU DID NOT HAVE A STEADY PLACE TO SLEEP OR SLEPT IN A SHELTER (INCLUDING NOW)?: NO

## 2023-02-20 SDOH — ECONOMIC STABILITY: FOOD INSECURITY: WITHIN THE PAST 12 MONTHS, THE FOOD YOU BOUGHT JUST DIDN'T LAST AND YOU DIDN'T HAVE MONEY TO GET MORE.: NEVER TRUE

## 2023-02-20 SDOH — ECONOMIC STABILITY: INCOME INSECURITY: HOW HARD IS IT FOR YOU TO PAY FOR THE VERY BASICS LIKE FOOD, HOUSING, MEDICAL CARE, AND HEATING?: NOT HARD AT ALL

## 2023-02-20 ASSESSMENT — ENCOUNTER SYMPTOMS
DIARRHEA: 0
ABDOMINAL PAIN: 0
VOMITING: 0
SHORTNESS OF BREATH: 0
CONSTIPATION: 0
COUGH: 0

## 2023-02-20 ASSESSMENT — PATIENT HEALTH QUESTIONNAIRE - PHQ9
SUM OF ALL RESPONSES TO PHQ QUESTIONS 1-9: 0
1. LITTLE INTEREST OR PLEASURE IN DOING THINGS: 0
SUM OF ALL RESPONSES TO PHQ QUESTIONS 1-9: 0
SUM OF ALL RESPONSES TO PHQ9 QUESTIONS 1 & 2: 0
SUM OF ALL RESPONSES TO PHQ QUESTIONS 1-9: 0
SUM OF ALL RESPONSES TO PHQ QUESTIONS 1-9: 0
2. FEELING DOWN, DEPRESSED OR HOPELESS: 0

## 2023-02-20 NOTE — PROGRESS NOTES
Subjective  Chief Complaint   Patient presents with    6 Month Follow-Up       HPI    A lot of issues with stomach recently  Has been \"lax with celiacs\" diet. Seems to help with bland diet. Due for colonoscopy. No definitive pain. A lot of \"gurgles\"  Had positive cologuard about a year ago. Has not had coloscopy yet since then. Had bad experience first time so nervous about doing it again. No change in stools recently. Went to PT for back for awhile. Seems to be doing some better. Anxiety has been better.    Doing deep breathing to help    Patient Active Problem List    Diagnosis Date Noted    Primary osteoarthritis involving multiple joints 09/20/2022    Back abscess 07/17/2020    Seasonal allergies 05/16/2016    Tachycardia 04/17/2015    Left inguinal hernia 17/90/2595    Umbilical hernia 42/89/8694    Celiac disease 03/09/2015    Breast lump on right side at 6 o'clock position 10/23/2014    Hypertension     Hypercholesterolemia      Past Medical History:   Diagnosis Date    Celiac disease     Hypercholesterolemia     Hypertension      Past Surgical History:   Procedure Laterality Date    COLONOSCOPY  12/19/11    normal    HERNIA REPAIR Left 5/8/15    repair of ventral hernia with mesh and left inguinal hernia repair Dr Angela Shaver Right     TONSILLECTOMY       Family History   Problem Relation Age of Onset    High Blood Pressure Sister     High Blood Pressure Mother      Social History     Socioeconomic History    Marital status:      Spouse name: None    Number of children: None    Years of education: None    Highest education level: None   Tobacco Use    Smoking status: Never    Smokeless tobacco: Never   Substance and Sexual Activity    Alcohol use: No    Drug use: No     Social Determinants of Health     Financial Resource Strain: Low Risk     Difficulty of Paying Living Expenses: Not hard at all   Food Insecurity: No Food Insecurity    Worried About Running Out of Food in the Last Year: Never true    Ran Out of Food in the Last Year: Never true   Transportation Needs: Unknown    Lack of Transportation (Non-Medical): No   Housing Stability: Unknown    Unstable Housing in the Last Year: No     Current Outpatient Medications on File Prior to Visit   Medication Sig Dispense Refill    olmesartan-hydroCHLOROthiazide (BENICAR HCT) 40-12.5 MG per tablet Take 1 tablet by mouth every day 90 tablet 1    amLODIPine (NORVASC) 5 MG tablet TAKE 1 TABLET daily 90 tablet 1    atorvastatin (LIPITOR) 20 MG tablet Take 1 tablet by mouth daily 90 tablet 1    naproxen (NAPROSYN) 500 MG tablet Take 1 tablet by mouth 2 times daily (with meals) 60 tablet 0     No current facility-administered medications on file prior to visit. Allergies   Allergen Reactions    Buspar [Buspirone Hcl] Other (See Comments)     Chills and decreased appetite. Lopressor [Metoprolol]      Light headed       Review of Systems   Constitutional:  Negative for fatigue. Respiratory:  Negative for cough and shortness of breath. Gastrointestinal:  Negative for abdominal pain, constipation, diarrhea and vomiting. Objective  Vitals:    02/20/23 0759   BP: 124/70   Pulse: 68   SpO2: 100%   Weight: 205 lb (93 kg)   Height: 6' 4\" (1.93 m)     Physical Exam  Vitals and nursing note reviewed. Constitutional:       Appearance: Normal appearance. HENT:      Head: Normocephalic. Nose: Nose normal.      Mouth/Throat:      Mouth: Mucous membranes are moist.      Pharynx: Oropharynx is clear. Eyes:      Extraocular Movements: Extraocular movements intact. Conjunctiva/sclera: Conjunctivae normal.      Pupils: Pupils are equal, round, and reactive to light. Cardiovascular:      Rate and Rhythm: Normal rate and regular rhythm. Pulses: Normal pulses. Heart sounds: Normal heart sounds. Pulmonary:      Effort: Pulmonary effort is normal.      Breath sounds: Normal breath sounds.    Musculoskeletal: Cervical back: Neck supple. Skin:     General: Skin is warm. Neurological:      General: No focal deficit present. Mental Status: He is alert and oriented to person, place, and time. Mental status is at baseline. Psychiatric:         Mood and Affect: Mood normal.         Behavior: Behavior normal.         Thought Content: Thought content normal.         Judgment: Judgment normal.       Assessment & Plan     Diagnosis Orders   1. Essential hypertension  Comprehensive Metabolic Panel  Well controlled      2. Hypercholesterolemia  Comprehensive Metabolic Panel    Lipid Panel  Well controlled with current meds      3. Other fatigue  TSH with Reflex      4. Elevated PSA  PSA, Diagnostic      5. Positive colorectal cancer screening using Cologuard test  [facility] Rhonda Benitez MD, Gastroenterology, [place]          Orders Placed This Encounter   Procedures    Comprehensive Metabolic Panel     Standing Status:   Future     Standing Expiration Date:   2/20/2024    Lipid Panel     Standing Status:   Future     Standing Expiration Date:   2/20/2024     Order Specific Question:   Is Patient Fasting?/# of Hours     Answer:   15     Order Specific Question:   Has the patient fasted? Answer:   Yes    TSH with Reflex     Standing Status:   Future     Standing Expiration Date:   2/20/2024    PSA, Diagnostic     Standing Status:   Future     Standing Expiration Date:   2/20/2024    Steffanie Fair MD, Gastroenterology, [facility]     Referral Priority:   Routine     Referral Type:   Eval and Treat     Referral Reason:   Specialty Services Required     Referred to Provider:   Randy Grayson MD     Requested Specialty:   Gastroenterology     Number of Visits Requested:   1       No orders of the defined types were placed in this encounter.       Medications Discontinued During This Encounter   Medication Reason    rosuvastatin (CRESTOR) 5 MG tablet LIST CLEANUP      Side effects, adverse effects of the medication prescribed today, as well as treatment plan/ rationale and result expectations have been discussed with the patient who expresses understanding and desires to proceed. Close follow up to evaluate treatment results and for coordination of care. I have reviewed the patient's medical history in detail and updated the computerized patient record. As always, patient is advised that if symptoms worsen in any way they will proceed to the nearest emergency room. FU in 6 mos.        Russ Lambert, ALLA - CNP

## 2023-02-21 DIAGNOSIS — R97.20 ELEVATED PSA: ICD-10-CM

## 2023-02-21 DIAGNOSIS — E78.00 HYPERCHOLESTEROLEMIA: ICD-10-CM

## 2023-02-21 DIAGNOSIS — I10 ESSENTIAL HYPERTENSION: ICD-10-CM

## 2023-02-21 DIAGNOSIS — R53.83 OTHER FATIGUE: ICD-10-CM

## 2023-02-21 LAB
ALBUMIN SERPL-MCNC: 4.6 G/DL (ref 3.5–4.6)
ALP BLD-CCNC: 65 U/L (ref 35–104)
ALT SERPL-CCNC: 15 U/L (ref 0–41)
ANION GAP SERPL CALCULATED.3IONS-SCNC: 12 MEQ/L (ref 9–15)
AST SERPL-CCNC: 16 U/L (ref 0–40)
BILIRUB SERPL-MCNC: 0.6 MG/DL (ref 0.2–0.7)
BUN BLDV-MCNC: 17 MG/DL (ref 6–20)
CALCIUM SERPL-MCNC: 9.5 MG/DL (ref 8.5–9.9)
CHLORIDE BLD-SCNC: 107 MEQ/L (ref 95–107)
CHOLESTEROL, TOTAL: 125 MG/DL (ref 0–199)
CO2: 27 MEQ/L (ref 20–31)
CREAT SERPL-MCNC: 0.88 MG/DL (ref 0.7–1.2)
GFR SERPL CREATININE-BSD FRML MDRD: >60 ML/MIN/{1.73_M2}
GLOBULIN: 2.5 G/DL (ref 2.3–3.5)
GLUCOSE BLD-MCNC: 98 MG/DL (ref 70–99)
HDLC SERPL-MCNC: 58 MG/DL (ref 40–59)
LDL CHOLESTEROL CALCULATED: 57 MG/DL (ref 0–129)
POTASSIUM SERPL-SCNC: 4.2 MEQ/L (ref 3.4–4.9)
PROSTATE SPECIFIC ANTIGEN: 3.2 NG/ML (ref 0–4)
SODIUM BLD-SCNC: 146 MEQ/L (ref 135–144)
TOTAL PROTEIN: 7.1 G/DL (ref 6.3–8)
TRIGL SERPL-MCNC: 50 MG/DL (ref 0–150)
TSH REFLEX: 1.3 UIU/ML (ref 0.44–3.86)

## 2023-04-20 ENCOUNTER — TELEPHONE (OUTPATIENT)
Dept: GASTROENTEROLOGY | Age: 58
End: 2023-04-20

## 2023-05-23 DIAGNOSIS — E78.00 HYPERCHOLESTEROLEMIA: ICD-10-CM

## 2023-05-23 DIAGNOSIS — I10 ESSENTIAL HYPERTENSION: ICD-10-CM

## 2023-05-24 RX ORDER — OLMESARTAN MEDOXOMIL AND HYDROCHLOROTHIAZIDE 40/12.5 40; 12.5 MG/1; MG/1
TABLET ORAL
Qty: 90 TABLET | Refills: 1 | Status: SHIPPED | OUTPATIENT
Start: 2023-05-24

## 2023-05-24 RX ORDER — ATORVASTATIN CALCIUM 20 MG/1
20 TABLET, FILM COATED ORAL DAILY
Qty: 90 TABLET | Refills: 1 | Status: SHIPPED | OUTPATIENT
Start: 2023-05-24

## 2023-05-24 RX ORDER — AMLODIPINE BESYLATE 5 MG/1
TABLET ORAL
Qty: 90 TABLET | Refills: 1 | Status: SHIPPED | OUTPATIENT
Start: 2023-05-24

## 2023-07-21 ENCOUNTER — OFFICE VISIT (OUTPATIENT)
Dept: FAMILY MEDICINE CLINIC | Age: 58
End: 2023-07-21
Payer: COMMERCIAL

## 2023-07-21 VITALS
OXYGEN SATURATION: 99 % | TEMPERATURE: 98.7 F | HEART RATE: 92 BPM | HEIGHT: 76 IN | BODY MASS INDEX: 25.09 KG/M2 | SYSTOLIC BLOOD PRESSURE: 124 MMHG | WEIGHT: 206 LBS | DIASTOLIC BLOOD PRESSURE: 82 MMHG

## 2023-07-21 DIAGNOSIS — H00.015 HORDEOLUM EXTERNUM OF LEFT LOWER EYELID: Primary | ICD-10-CM

## 2023-07-21 PROCEDURE — 3017F COLORECTAL CA SCREEN DOC REV: CPT | Performed by: FAMILY MEDICINE

## 2023-07-21 PROCEDURE — 3079F DIAST BP 80-89 MM HG: CPT | Performed by: FAMILY MEDICINE

## 2023-07-21 PROCEDURE — G8419 CALC BMI OUT NRM PARAM NOF/U: HCPCS | Performed by: FAMILY MEDICINE

## 2023-07-21 PROCEDURE — 3074F SYST BP LT 130 MM HG: CPT | Performed by: FAMILY MEDICINE

## 2023-07-21 PROCEDURE — 1036F TOBACCO NON-USER: CPT | Performed by: FAMILY MEDICINE

## 2023-07-21 PROCEDURE — G8427 DOCREV CUR MEDS BY ELIG CLIN: HCPCS | Performed by: FAMILY MEDICINE

## 2023-07-21 PROCEDURE — 99213 OFFICE O/P EST LOW 20 MIN: CPT | Performed by: FAMILY MEDICINE

## 2023-07-21 RX ORDER — BACITRACIN ZINC AND POLYMYXIN B SULFATE 500; 10000 [USP'U]/G; [USP'U]/G
OINTMENT OPHTHALMIC 2 TIMES DAILY
Qty: 3.5 G | Refills: 1 | Status: SHIPPED | OUTPATIENT
Start: 2023-07-21 | End: 2023-07-31

## 2023-07-21 RX ORDER — CLINDAMYCIN HYDROCHLORIDE 300 MG/1
300 CAPSULE ORAL 3 TIMES DAILY
Qty: 30 CAPSULE | Refills: 0 | Status: SHIPPED | OUTPATIENT
Start: 2023-07-21 | End: 2023-07-31

## 2023-09-13 ENCOUNTER — OFFICE VISIT (OUTPATIENT)
Dept: FAMILY MEDICINE CLINIC | Age: 58
End: 2023-09-13
Payer: COMMERCIAL

## 2023-09-13 VITALS
WEIGHT: 212 LBS | DIASTOLIC BLOOD PRESSURE: 72 MMHG | HEIGHT: 76 IN | BODY MASS INDEX: 25.82 KG/M2 | SYSTOLIC BLOOD PRESSURE: 110 MMHG

## 2023-09-13 DIAGNOSIS — R97.20 ELEVATED PSA: ICD-10-CM

## 2023-09-13 DIAGNOSIS — E78.00 HYPERCHOLESTEROLEMIA: ICD-10-CM

## 2023-09-13 DIAGNOSIS — E87.0 HYPERNATREMIA: ICD-10-CM

## 2023-09-13 DIAGNOSIS — I10 PRIMARY HYPERTENSION: Primary | ICD-10-CM

## 2023-09-13 LAB
ALBUMIN SERPL-MCNC: 4.8 G/DL (ref 3.5–4.6)
ALP SERPL-CCNC: 61 U/L (ref 35–104)
ALT SERPL-CCNC: 28 U/L (ref 0–41)
ANION GAP SERPL CALCULATED.3IONS-SCNC: 11 MEQ/L (ref 9–15)
AST SERPL-CCNC: 21 U/L (ref 0–40)
BILIRUB SERPL-MCNC: 0.6 MG/DL (ref 0.2–0.7)
BUN SERPL-MCNC: 14 MG/DL (ref 6–20)
CALCIUM SERPL-MCNC: 9.5 MG/DL (ref 8.5–9.9)
CHLORIDE SERPL-SCNC: 104 MEQ/L (ref 95–107)
CO2 SERPL-SCNC: 26 MEQ/L (ref 20–31)
CREAT SERPL-MCNC: 0.9 MG/DL (ref 0.7–1.2)
GLOBULIN SER CALC-MCNC: 2.4 G/DL (ref 2.3–3.5)
GLUCOSE SERPL-MCNC: 103 MG/DL (ref 70–99)
POTASSIUM SERPL-SCNC: 4 MEQ/L (ref 3.4–4.9)
PROT SERPL-MCNC: 7.2 G/DL (ref 6.3–8)
PSA SERPL-MCNC: 3.56 NG/ML (ref 0–4)
SODIUM SERPL-SCNC: 141 MEQ/L (ref 135–144)

## 2023-09-13 PROCEDURE — G8427 DOCREV CUR MEDS BY ELIG CLIN: HCPCS | Performed by: NURSE PRACTITIONER

## 2023-09-13 PROCEDURE — 99213 OFFICE O/P EST LOW 20 MIN: CPT | Performed by: NURSE PRACTITIONER

## 2023-09-13 PROCEDURE — 1036F TOBACCO NON-USER: CPT | Performed by: NURSE PRACTITIONER

## 2023-09-13 PROCEDURE — 3017F COLORECTAL CA SCREEN DOC REV: CPT | Performed by: NURSE PRACTITIONER

## 2023-09-13 PROCEDURE — 3078F DIAST BP <80 MM HG: CPT | Performed by: NURSE PRACTITIONER

## 2023-09-13 PROCEDURE — 3074F SYST BP LT 130 MM HG: CPT | Performed by: NURSE PRACTITIONER

## 2023-09-13 PROCEDURE — G8419 CALC BMI OUT NRM PARAM NOF/U: HCPCS | Performed by: NURSE PRACTITIONER

## 2023-09-13 ASSESSMENT — ENCOUNTER SYMPTOMS
SHORTNESS OF BREATH: 0
CONSTIPATION: 0
DIARRHEA: 0
COUGH: 0

## 2023-09-13 NOTE — PROGRESS NOTES
clear.   Eyes:      Extraocular Movements: Extraocular movements intact. Conjunctiva/sclera: Conjunctivae normal.      Pupils: Pupils are equal, round, and reactive to light. Cardiovascular:      Rate and Rhythm: Normal rate and regular rhythm. Pulses: Normal pulses. Heart sounds: Normal heart sounds. Pulmonary:      Effort: Pulmonary effort is normal.      Breath sounds: Normal breath sounds. Musculoskeletal:      Cervical back: Neck supple. Skin:     General: Skin is warm. Neurological:      General: No focal deficit present. Mental Status: He is alert and oriented to person, place, and time. Mental status is at baseline. Psychiatric:         Mood and Affect: Mood normal.         Behavior: Behavior normal.         Thought Content: Thought content normal.         Judgment: Judgment normal.         Assessment & Plan     Diagnosis Orders   1. Primary hypertension  Well controlled      2. Hypernatremia  Comprehensive Metabolic Panel      3. Elevated PSA  PSA, Diagnostic      4. Hypercholesterolemia  Will continue to follow. Stable           Orders Placed This Encounter   Procedures    Comprehensive Metabolic Panel     Standing Status:   Future     Number of Occurrences:   1     Standing Expiration Date:   9/13/2024    PSA, Diagnostic     Standing Status:   Future     Number of Occurrences:   1     Standing Expiration Date:   9/13/2024       No orders of the defined types were placed in this encounter. Medications Discontinued During This Encounter   Medication Reason    naproxen (NAPROSYN) 500 MG tablet       Side effects, adverse effects of the medication prescribed today, as well as treatment plan/ rationale and result expectations have been discussed with the patient who expresses understanding and desires to proceed. Close follow up to evaluate treatment results and for coordination of care.   I have reviewed the patient's medical history in detail and updated the

## 2023-10-29 DIAGNOSIS — I10 ESSENTIAL HYPERTENSION: ICD-10-CM

## 2023-10-29 DIAGNOSIS — E78.00 HYPERCHOLESTEROLEMIA: ICD-10-CM

## 2023-10-30 RX ORDER — OLMESARTAN MEDOXOMIL AND HYDROCHLOROTHIAZIDE 40/12.5 40; 12.5 MG/1; MG/1
TABLET ORAL
Qty: 90 TABLET | Refills: 1 | Status: SHIPPED | OUTPATIENT
Start: 2023-10-30

## 2023-10-30 RX ORDER — ATORVASTATIN CALCIUM 20 MG/1
20 TABLET, FILM COATED ORAL DAILY
Qty: 90 TABLET | Refills: 1 | Status: SHIPPED | OUTPATIENT
Start: 2023-10-30

## 2023-10-30 RX ORDER — AMLODIPINE BESYLATE 5 MG/1
TABLET ORAL
Qty: 90 TABLET | Refills: 1 | Status: SHIPPED | OUTPATIENT
Start: 2023-10-30

## 2023-10-30 NOTE — TELEPHONE ENCOUNTER
Last Office Visit (last PCP visit):   9/13/2023    Next Visit Date:  Future Appointments   Date Time Provider [address]   3/13/2024  9:15 AM ALLA Aguilera CNP [facility] AT [place]       **If hasn't been seen in over a year OR hasn't followed up according to last diabetes/ADHD visit, make appointment for patient before sending refill to provider.     Rx requested:  Requested Prescriptions     Pending Prescriptions Disp Refills    amLODIPine (NORVASC) 5 MG tablet [Pharmacy Med Name: amlodipine 5 mg tablet] 90 tablet 1     Sig: TAKE 1 TABLET BY MOUTH DAILY    atorvastatin (LIPITOR) 20 MG tablet [Pharmacy Med Name: atorvastatin 20 mg tablet] 90 tablet 1     Sig: TAKE 1 TABLET BY MOUTH DAILY    olmesartan-hydroCHLOROthiazide (BENICAR HCT) 40-12.5 MG per tablet [Pharmacy Med Name: olmesartan 40 mg-hydrochlorothiazide 12.5 mg tablet] 90 tablet 1     Sig: TAKE 1 TABLET BY MOUTH EVERY DAY

## 2023-11-27 DIAGNOSIS — E78.00 HYPERCHOLESTEROLEMIA: ICD-10-CM

## 2023-11-27 DIAGNOSIS — I10 ESSENTIAL HYPERTENSION: ICD-10-CM

## 2023-11-27 RX ORDER — ATORVASTATIN CALCIUM 20 MG/1
20 TABLET, FILM COATED ORAL DAILY
Qty: 90 TABLET | Refills: 1 | Status: SHIPPED | OUTPATIENT
Start: 2023-11-27

## 2023-11-27 RX ORDER — AMLODIPINE BESYLATE 5 MG/1
TABLET ORAL
Qty: 90 TABLET | Refills: 1 | Status: SHIPPED | OUTPATIENT
Start: 2023-11-27

## 2023-11-27 RX ORDER — OLMESARTAN MEDOXOMIL AND HYDROCHLOROTHIAZIDE 40/12.5 40; 12.5 MG/1; MG/1
1 TABLET ORAL DAILY
Qty: 90 TABLET | Refills: 1 | Status: SHIPPED | OUTPATIENT
Start: 2023-11-27

## 2023-11-27 NOTE — TELEPHONE ENCOUNTER
Comments:     Last Office Visit (last PCP visit):   9/13/2023    Next Visit Date:  Future Appointments   Date Time Provider [address]   3/13/2024  9:15 AM ALLA Aguilera CNP [facility] AT [place]       **If hasn't been seen in over a year OR hasn't followed up according to last diabetes/ADHD visit, make appointment for patient before sending refill to provider.     Rx requested:  Requested Prescriptions     Pending Prescriptions Disp Refills    amLODIPine (NORVASC) 5 MG tablet 90 tablet 1     Sig: TAKE 1 TABLET BY MOUTH DAILY    atorvastatin (LIPITOR) 20 MG tablet 90 tablet 1     Sig: Take 1 tablet by mouth daily    olmesartan-hydroCHLOROthiazide (BENICAR HCT) 40-12.5 MG per tablet 90 tablet 1     Sig: Take 1 tablet by mouth daily

## 2024-03-13 ENCOUNTER — OFFICE VISIT (OUTPATIENT)
Dept: FAMILY MEDICINE CLINIC | Age: 59
End: 2024-03-13
Payer: COMMERCIAL

## 2024-03-13 VITALS
HEART RATE: 74 BPM | DIASTOLIC BLOOD PRESSURE: 68 MMHG | HEIGHT: 76 IN | TEMPERATURE: 96.5 F | BODY MASS INDEX: 27.58 KG/M2 | OXYGEN SATURATION: 98 % | SYSTOLIC BLOOD PRESSURE: 132 MMHG | WEIGHT: 226.5 LBS

## 2024-03-13 DIAGNOSIS — I10 ESSENTIAL HYPERTENSION: Primary | ICD-10-CM

## 2024-03-13 DIAGNOSIS — R97.20 ELEVATED PSA: ICD-10-CM

## 2024-03-13 DIAGNOSIS — E78.00 HYPERCHOLESTEROLEMIA: ICD-10-CM

## 2024-03-13 DIAGNOSIS — R73.03 BORDERLINE DIABETES MELLITUS: ICD-10-CM

## 2024-03-13 PROCEDURE — 3017F COLORECTAL CA SCREEN DOC REV: CPT | Performed by: NURSE PRACTITIONER

## 2024-03-13 PROCEDURE — 99214 OFFICE O/P EST MOD 30 MIN: CPT | Performed by: NURSE PRACTITIONER

## 2024-03-13 PROCEDURE — 1036F TOBACCO NON-USER: CPT | Performed by: NURSE PRACTITIONER

## 2024-03-13 PROCEDURE — 3075F SYST BP GE 130 - 139MM HG: CPT | Performed by: NURSE PRACTITIONER

## 2024-03-13 PROCEDURE — G8427 DOCREV CUR MEDS BY ELIG CLIN: HCPCS | Performed by: NURSE PRACTITIONER

## 2024-03-13 PROCEDURE — G8484 FLU IMMUNIZE NO ADMIN: HCPCS | Performed by: NURSE PRACTITIONER

## 2024-03-13 PROCEDURE — 3078F DIAST BP <80 MM HG: CPT | Performed by: NURSE PRACTITIONER

## 2024-03-13 PROCEDURE — G8419 CALC BMI OUT NRM PARAM NOF/U: HCPCS | Performed by: NURSE PRACTITIONER

## 2024-03-13 SDOH — ECONOMIC STABILITY: INCOME INSECURITY: HOW HARD IS IT FOR YOU TO PAY FOR THE VERY BASICS LIKE FOOD, HOUSING, MEDICAL CARE, AND HEATING?: NOT HARD AT ALL

## 2024-03-13 SDOH — ECONOMIC STABILITY: FOOD INSECURITY: WITHIN THE PAST 12 MONTHS, YOU WORRIED THAT YOUR FOOD WOULD RUN OUT BEFORE YOU GOT MONEY TO BUY MORE.: NEVER TRUE

## 2024-03-13 SDOH — ECONOMIC STABILITY: FOOD INSECURITY: WITHIN THE PAST 12 MONTHS, THE FOOD YOU BOUGHT JUST DIDN'T LAST AND YOU DIDN'T HAVE MONEY TO GET MORE.: NEVER TRUE

## 2024-03-13 ASSESSMENT — PATIENT HEALTH QUESTIONNAIRE - PHQ9
SUM OF ALL RESPONSES TO PHQ9 QUESTIONS 1 & 2: 0
SUM OF ALL RESPONSES TO PHQ QUESTIONS 1-9: 0
1. LITTLE INTEREST OR PLEASURE IN DOING THINGS: 0
SUM OF ALL RESPONSES TO PHQ QUESTIONS 1-9: 0
2. FEELING DOWN, DEPRESSED OR HOPELESS: 0
SUM OF ALL RESPONSES TO PHQ9 QUESTIONS 1 & 2: 0
2. FEELING DOWN, DEPRESSED OR HOPELESS: 0
1. LITTLE INTEREST OR PLEASURE IN DOING THINGS: 0

## 2024-03-13 ASSESSMENT — ENCOUNTER SYMPTOMS
COUGH: 0
SHORTNESS OF BREATH: 0
DIARRHEA: 0
CONSTIPATION: 0

## 2024-03-13 NOTE — PROGRESS NOTES
Subjective  Chief Complaint   Patient presents with    6 Month Follow-Up    Hypertension    Health Maintenance     Flu and colon screening declined       HPI    HTN- monitors periodically. Has been up a bit. Systolic has been up around 140.   Taking medication as directed.  Admits to gaining a few lbs over the winter.  Planning on working on it.     Hyperlipidemia- taking med. Working on weight.  Does do walking for exercise.   Knows that he should probably do more.    Of note, pt is due for colon cancer screening. He had a positive cologuard about two years ago and has failed to fu with colonoscopy despite recommendation. He notes that he has been thinking about this and realizes he should get it done. Denies other symptoms.    Patient Active Problem List    Diagnosis Date Noted    Primary osteoarthritis involving multiple joints 09/20/2022    Back abscess 07/17/2020    Seasonal allergies 05/16/2016    Tachycardia 04/17/2015    Left inguinal hernia 03/18/2015    Umbilical hernia 03/18/2015    Celiac disease 03/09/2015    Breast lump on right side at 6 o'clock position 10/23/2014    Hypertension     Hypercholesterolemia      Past Medical History:   Diagnosis Date    Celiac disease     Hypercholesterolemia     Hypertension      Past Surgical History:   Procedure Laterality Date    COLONOSCOPY  12/19/11    normal    HERNIA REPAIR Left 5/8/15    repair of ventral hernia with mesh and left inguinal hernia repair Dr Henry    INGUINAL HERNIA REPAIR Right     TONSILLECTOMY       Family History   Problem Relation Age of Onset    High Blood Pressure Sister     High Blood Pressure Mother      Social History     Socioeconomic History    Marital status:      Spouse name: None    Number of children: None    Years of education: None    Highest education level: None   Tobacco Use    Smoking status: Never     Passive exposure: Never    Smokeless tobacco: Never   Substance and Sexual Activity    Alcohol use: No    Drug

## 2024-03-15 DIAGNOSIS — E78.00 HYPERCHOLESTEROLEMIA: ICD-10-CM

## 2024-03-15 DIAGNOSIS — R97.20 ELEVATED PSA: ICD-10-CM

## 2024-03-15 DIAGNOSIS — R73.03 BORDERLINE DIABETES MELLITUS: ICD-10-CM

## 2024-03-15 DIAGNOSIS — I10 ESSENTIAL HYPERTENSION: ICD-10-CM

## 2024-03-15 LAB
ALBUMIN SERPL-MCNC: 4.4 G/DL (ref 3.5–4.6)
ALP SERPL-CCNC: 68 U/L (ref 35–104)
ALT SERPL-CCNC: 39 U/L (ref 0–41)
ANION GAP SERPL CALCULATED.3IONS-SCNC: 9 MEQ/L (ref 9–15)
AST SERPL-CCNC: 25 U/L (ref 0–40)
BILIRUB SERPL-MCNC: 0.7 MG/DL (ref 0.2–0.7)
BUN SERPL-MCNC: 19 MG/DL (ref 6–20)
CALCIUM SERPL-MCNC: 9.5 MG/DL (ref 8.5–9.9)
CHLORIDE SERPL-SCNC: 104 MEQ/L (ref 95–107)
CHOLEST SERPL-MCNC: 136 MG/DL (ref 0–199)
CO2 SERPL-SCNC: 27 MEQ/L (ref 20–31)
CREAT SERPL-MCNC: 0.98 MG/DL (ref 0.7–1.2)
GLOBULIN SER CALC-MCNC: 2.9 G/DL (ref 2.3–3.5)
GLUCOSE SERPL-MCNC: 94 MG/DL (ref 70–99)
HBA1C MFR BLD: 5.3 % (ref 4.8–5.9)
HDLC SERPL-MCNC: 50 MG/DL (ref 40–59)
LDLC SERPL CALC-MCNC: 72 MG/DL (ref 0–129)
POTASSIUM SERPL-SCNC: 4.1 MEQ/L (ref 3.4–4.9)
PROT SERPL-MCNC: 7.3 G/DL (ref 6.3–8)
PSA SERPL-MCNC: 3.53 NG/ML (ref 0–4)
SODIUM SERPL-SCNC: 140 MEQ/L (ref 135–144)
TRIGL SERPL-MCNC: 70 MG/DL (ref 0–150)

## 2024-03-16 LAB
PROSTATIC SPECIFIC AG: 3.2 NG/ML (ref 0–4)
PSA FREE MFR SERPL: 40.6 %
PSA FREE SERPL-MCNC: 1.3 UG/L

## 2024-05-27 DIAGNOSIS — E78.00 HYPERCHOLESTEROLEMIA: ICD-10-CM

## 2024-05-27 DIAGNOSIS — I10 ESSENTIAL HYPERTENSION: ICD-10-CM

## 2024-05-28 RX ORDER — ATORVASTATIN CALCIUM 20 MG/1
20 TABLET, FILM COATED ORAL DAILY
Qty: 90 TABLET | Refills: 1 | Status: SHIPPED | OUTPATIENT
Start: 2024-05-28

## 2024-05-28 RX ORDER — AMLODIPINE BESYLATE 5 MG/1
TABLET ORAL
Qty: 90 TABLET | Refills: 1 | Status: SHIPPED | OUTPATIENT
Start: 2024-05-28

## 2024-05-28 RX ORDER — OLMESARTAN MEDOXOMIL AND HYDROCHLOROTHIAZIDE 40/12.5 40; 12.5 MG/1; MG/1
1 TABLET ORAL DAILY
Qty: 90 TABLET | Refills: 1 | Status: SHIPPED | OUTPATIENT
Start: 2024-05-28

## 2024-05-28 NOTE — TELEPHONE ENCOUNTER
Comments:     Last Office Visit (last PCP visit):   3/13/2024    Next Visit Date:  Future Appointments   Date Time Provider Department Center   9/9/2024 10:00 AM [facility]       **If hasn't been seen in over a year OR hasn't followed up according to last diabetes/ADHD visit, make appointment for patient before sending refill to provider.    Rx requested:  Requested Prescriptions     Pending Prescriptions Disp Refills    amLODIPine (NORVASC) 5 MG tablet 90 tablet 1     Sig: TAKE 1 TABLET BY MOUTH DAILY    atorvastatin (LIPITOR) 20 MG tablet 90 tablet 1     Sig: Take 1 tablet by mouth daily    olmesartan-hydroCHLOROthiazide (BENICAR HCT) 40-12.5 MG per tablet 90 tablet 1     Sig: Take 1 tablet by mouth daily

## 2024-09-09 ENCOUNTER — OFFICE VISIT (OUTPATIENT)
Dept: FAMILY MEDICINE CLINIC | Age: 59
End: 2024-09-09
Payer: COMMERCIAL

## 2024-09-09 VITALS
BODY MASS INDEX: 28.79 KG/M2 | SYSTOLIC BLOOD PRESSURE: 132 MMHG | TEMPERATURE: 97.9 F | WEIGHT: 236.4 LBS | OXYGEN SATURATION: 95 % | DIASTOLIC BLOOD PRESSURE: 72 MMHG | HEIGHT: 76 IN | HEART RATE: 84 BPM

## 2024-09-09 DIAGNOSIS — R73.03 BORDERLINE DIABETES MELLITUS: ICD-10-CM

## 2024-09-09 DIAGNOSIS — E78.00 HYPERCHOLESTEROLEMIA: ICD-10-CM

## 2024-09-09 DIAGNOSIS — M79.89 LEG SWELLING: ICD-10-CM

## 2024-09-09 DIAGNOSIS — I10 ESSENTIAL HYPERTENSION: Primary | ICD-10-CM

## 2024-09-09 PROCEDURE — 3075F SYST BP GE 130 - 139MM HG: CPT | Performed by: NURSE PRACTITIONER

## 2024-09-09 PROCEDURE — 3078F DIAST BP <80 MM HG: CPT | Performed by: NURSE PRACTITIONER

## 2024-09-09 PROCEDURE — 99213 OFFICE O/P EST LOW 20 MIN: CPT | Performed by: NURSE PRACTITIONER

## 2024-09-09 PROCEDURE — G8427 DOCREV CUR MEDS BY ELIG CLIN: HCPCS | Performed by: NURSE PRACTITIONER

## 2024-09-09 PROCEDURE — 1036F TOBACCO NON-USER: CPT | Performed by: NURSE PRACTITIONER

## 2024-09-09 PROCEDURE — G8419 CALC BMI OUT NRM PARAM NOF/U: HCPCS | Performed by: NURSE PRACTITIONER

## 2024-09-09 PROCEDURE — 3017F COLORECTAL CA SCREEN DOC REV: CPT | Performed by: NURSE PRACTITIONER

## 2024-09-09 ASSESSMENT — ENCOUNTER SYMPTOMS
CONSTIPATION: 0
SHORTNESS OF BREATH: 0
DIARRHEA: 0
COUGH: 0

## 2024-11-14 DIAGNOSIS — I10 ESSENTIAL HYPERTENSION: ICD-10-CM

## 2024-11-14 RX ORDER — OLMESARTAN MEDOXOMIL AND HYDROCHLOROTHIAZIDE 40/12.5 40; 12.5 MG/1; MG/1
1 TABLET ORAL DAILY
Qty: 90 TABLET | Refills: 1 | Status: SHIPPED | OUTPATIENT
Start: 2024-11-14

## 2024-11-14 NOTE — TELEPHONE ENCOUNTER
Comments:     Last Office Visit (last PCP visit):   9/9/2024    Next Visit Date:  Future Appointments   Date Time Provider Department Center   3/10/2025 10:00 AM Ginger Nguyen APRN - CNP [facility]       **If hasn't been seen in over a year OR hasn't followed up according to last diabetes/ADHD visit, make appointment for patient before sending refill to provider.    Rx requested:  Requested Prescriptions     Pending Prescriptions Disp Refills    olmesartan-hydroCHLOROthiazide (BENICAR HCT) 40-12.5 MG per tablet [Pharmacy Med Name: olmesartan 40 mg-hydrochlorothiazide 12.5 mg tablet] 90 tablet 1     Sig: TAKE 1 TABLET BY MOUTH DAILY

## 2024-11-24 DIAGNOSIS — I10 ESSENTIAL HYPERTENSION: ICD-10-CM

## 2024-11-24 DIAGNOSIS — E78.00 HYPERCHOLESTEROLEMIA: ICD-10-CM

## 2024-11-25 RX ORDER — AMLODIPINE BESYLATE 5 MG/1
TABLET ORAL
Qty: 90 TABLET | Refills: 1 | Status: SHIPPED | OUTPATIENT
Start: 2024-11-25

## 2024-11-25 RX ORDER — ATORVASTATIN CALCIUM 20 MG/1
20 TABLET, FILM COATED ORAL DAILY
Qty: 90 TABLET | Refills: 1 | Status: SHIPPED | OUTPATIENT
Start: 2024-11-25

## 2024-11-25 RX ORDER — OLMESARTAN MEDOXOMIL AND HYDROCHLOROTHIAZIDE 40/12.5 40; 12.5 MG/1; MG/1
1 TABLET ORAL DAILY
Qty: 90 TABLET | Refills: 1 | OUTPATIENT
Start: 2024-11-25

## 2024-12-27 DIAGNOSIS — E78.00 HYPERCHOLESTEROLEMIA: ICD-10-CM

## 2024-12-27 RX ORDER — ATORVASTATIN CALCIUM 20 MG/1
20 TABLET, FILM COATED ORAL DAILY
Qty: 90 TABLET | Refills: 1 | Status: SHIPPED | OUTPATIENT
Start: 2024-12-27

## 2024-12-27 NOTE — TELEPHONE ENCOUNTER
Comments:     Last Office Visit (last PCP visit):   9/9/2024    Next Visit Date:  Future Appointments   Date Time Provider Department Center   3/10/2025 10:00 AM Ginger Nguyen APRN - CNP [facility]       **If hasn't been seen in over a year OR hasn't followed up according to last diabetes/ADHD visit, make appointment for patient before sending refill to provider.    Rx requested:  Requested Prescriptions     Pending Prescriptions Disp Refills    atorvastatin (LIPITOR) 20 MG tablet 90 tablet 1     Sig: Take 1 tablet by mouth daily

## 2025-03-10 ENCOUNTER — OFFICE VISIT (OUTPATIENT)
Dept: FAMILY MEDICINE CLINIC | Age: 60
End: 2025-03-10
Payer: COMMERCIAL

## 2025-03-10 VITALS
SYSTOLIC BLOOD PRESSURE: 130 MMHG | TEMPERATURE: 97.1 F | HEIGHT: 76 IN | OXYGEN SATURATION: 96 % | HEART RATE: 78 BPM | WEIGHT: 236 LBS | DIASTOLIC BLOOD PRESSURE: 74 MMHG | BODY MASS INDEX: 28.74 KG/M2

## 2025-03-10 DIAGNOSIS — Z12.5 SCREENING PSA (PROSTATE SPECIFIC ANTIGEN): ICD-10-CM

## 2025-03-10 DIAGNOSIS — E78.00 HYPERCHOLESTEROLEMIA: ICD-10-CM

## 2025-03-10 DIAGNOSIS — R73.03 BORDERLINE DIABETES MELLITUS: ICD-10-CM

## 2025-03-10 DIAGNOSIS — I10 ESSENTIAL HYPERTENSION: Primary | ICD-10-CM

## 2025-03-10 DIAGNOSIS — Z12.11 COLON CANCER SCREENING: ICD-10-CM

## 2025-03-10 PROCEDURE — 3078F DIAST BP <80 MM HG: CPT | Performed by: NURSE PRACTITIONER

## 2025-03-10 PROCEDURE — 3075F SYST BP GE 130 - 139MM HG: CPT | Performed by: NURSE PRACTITIONER

## 2025-03-10 PROCEDURE — 99214 OFFICE O/P EST MOD 30 MIN: CPT | Performed by: NURSE PRACTITIONER

## 2025-03-10 SDOH — ECONOMIC STABILITY: FOOD INSECURITY: WITHIN THE PAST 12 MONTHS, YOU WORRIED THAT YOUR FOOD WOULD RUN OUT BEFORE YOU GOT MONEY TO BUY MORE.: NEVER TRUE

## 2025-03-10 SDOH — ECONOMIC STABILITY: FOOD INSECURITY: WITHIN THE PAST 12 MONTHS, THE FOOD YOU BOUGHT JUST DIDN'T LAST AND YOU DIDN'T HAVE MONEY TO GET MORE.: NEVER TRUE

## 2025-03-10 ASSESSMENT — ENCOUNTER SYMPTOMS
DIARRHEA: 0
EYES NEGATIVE: 1
EYE DISCHARGE: 0
EYE REDNESS: 0
SHORTNESS OF BREATH: 0
EYE ITCHING: 0
EYE PAIN: 0
RESPIRATORY NEGATIVE: 1
CONSTIPATION: 0
COUGH: 0

## 2025-03-10 ASSESSMENT — PATIENT HEALTH QUESTIONNAIRE - PHQ9
2. FEELING DOWN, DEPRESSED OR HOPELESS: NOT AT ALL
SUM OF ALL RESPONSES TO PHQ QUESTIONS 1-9: 0
1. LITTLE INTEREST OR PLEASURE IN DOING THINGS: NOT AT ALL

## 2025-03-10 NOTE — PROGRESS NOTES
Subjective  Chief Complaint   Patient presents with    Hypertension       HPI    History of Present Illness  The patient is a 59-year-old male who presents for health maintenance.    He reports overall good health, with the exception of a head cold he experienced approximately 2 months ago. This cold was characterized by significant nasal congestion and discharge, but notably, he did not experience any fever or respiratory distress. His pulse oximetry readings remained within normal limits throughout the illness. He also mentions a recent trip to [place], during which his symptoms temporarily subsided, only to return upon his return to a colder climate. He speculates that these symptoms may be indicative of allergies.    He is due for his annual blood work.  He recalls a previous recommendation for a PSA test and a consultation with a urologist, but was subsequently informed that his results were within normal limits. He has not had any prior consultations with a urologist.    He underwent a colonoscopy in 2011, which yielded normal results. However, he had a negative experience during the procedure as he was awake throughout. He also had an endoscopy performed at the same time. He has expressed concerns about insurance coverage for these procedures due to their pre-existing status. He is willing to be referred for a colonoscopy at this time.     FAMILY HISTORY  His father has had cancer and his PSA levels are rising again.    Patient Active Problem List    Diagnosis Date Noted    Primary osteoarthritis involving multiple joints 09/20/2022    Borderline diabetes mellitus 09/09/2024    Back abscess 07/17/2020    Seasonal allergies 05/16/2016    Tachycardia 04/17/2015    Left inguinal hernia 03/18/2015    Umbilical hernia 03/18/2015    Celiac disease 03/09/2015    Breast lump on right side at 6 o'clock position 10/23/2014    Essential hypertension     Hypercholesterolemia      Past Medical History:   Diagnosis Date

## 2025-03-11 DIAGNOSIS — R73.03 BORDERLINE DIABETES MELLITUS: ICD-10-CM

## 2025-03-11 DIAGNOSIS — E78.00 HYPERCHOLESTEROLEMIA: ICD-10-CM

## 2025-03-11 DIAGNOSIS — Z12.5 SCREENING PSA (PROSTATE SPECIFIC ANTIGEN): ICD-10-CM

## 2025-03-11 DIAGNOSIS — I10 ESSENTIAL HYPERTENSION: ICD-10-CM

## 2025-03-11 LAB
ALBUMIN SERPL-MCNC: 4.5 G/DL (ref 3.5–4.6)
ALP SERPL-CCNC: 67 U/L (ref 35–104)
ALT SERPL-CCNC: 18 U/L (ref 0–41)
ANION GAP SERPL CALCULATED.3IONS-SCNC: 15 MEQ/L (ref 9–15)
AST SERPL-CCNC: 18 U/L (ref 0–40)
BILIRUB SERPL-MCNC: 0.7 MG/DL (ref 0.2–0.7)
BUN SERPL-MCNC: 17 MG/DL (ref 6–20)
CALCIUM SERPL-MCNC: 9.3 MG/DL (ref 8.5–9.9)
CHLORIDE SERPL-SCNC: 102 MEQ/L (ref 95–107)
CHOLEST SERPL-MCNC: 140 MG/DL (ref 0–199)
CO2 SERPL-SCNC: 24 MEQ/L (ref 20–31)
CREAT SERPL-MCNC: 0.99 MG/DL (ref 0.7–1.2)
GLOBULIN SER CALC-MCNC: 2.8 G/DL (ref 2.3–3.5)
GLUCOSE SERPL-MCNC: 89 MG/DL (ref 70–99)
HDLC SERPL-MCNC: 48 MG/DL (ref 40–59)
LDLC SERPL CALC-MCNC: 79 MG/DL (ref 0–129)
POTASSIUM SERPL-SCNC: 4.2 MEQ/L (ref 3.4–4.9)
PROT SERPL-MCNC: 7.3 G/DL (ref 6.3–8)
PSA SERPL-MCNC: 3.95 NG/ML (ref 0–4)
SODIUM SERPL-SCNC: 141 MEQ/L (ref 135–144)
TRIGL SERPL-MCNC: 66 MG/DL (ref 0–150)

## 2025-03-12 ENCOUNTER — RESULTS FOLLOW-UP (OUTPATIENT)
Dept: FAMILY MEDICINE CLINIC | Age: 60
End: 2025-03-12

## 2025-03-12 LAB
ESTIMATED AVERAGE GLUCOSE: 108 MG/DL
HBA1C MFR BLD: 5.4 % (ref 4–6)

## 2025-03-24 RX ORDER — POLYETHYLENE GLYCOL 3350, SODIUM CHLORIDE, SODIUM BICARBONATE, POTASSIUM CHLORIDE 420; 11.2; 5.72; 1.48 G/4L; G/4L; G/4L; G/4L
4000 POWDER, FOR SOLUTION ORAL ONCE
Qty: 4000 ML | Refills: 0 | Status: SHIPPED | OUTPATIENT
Start: 2025-03-24 | End: 2025-03-24

## 2025-03-28 ENCOUNTER — PREP FOR PROCEDURE (OUTPATIENT)
Dept: GASTROENTEROLOGY | Age: 60
End: 2025-03-28

## 2025-03-31 RX ORDER — SODIUM CHLORIDE 0.9 % (FLUSH) 0.9 %
5-40 SYRINGE (ML) INJECTION PRN
Status: CANCELLED | OUTPATIENT
Start: 2025-03-31

## 2025-03-31 RX ORDER — SODIUM CHLORIDE 9 MG/ML
INJECTION, SOLUTION INTRAVENOUS PRN
Status: CANCELLED | OUTPATIENT
Start: 2025-03-31

## 2025-03-31 RX ORDER — SODIUM CHLORIDE 9 MG/ML
INJECTION, SOLUTION INTRAVENOUS CONTINUOUS
Status: CANCELLED | OUTPATIENT
Start: 2025-03-31

## 2025-03-31 RX ORDER — SODIUM CHLORIDE 0.9 % (FLUSH) 0.9 %
5-40 SYRINGE (ML) INJECTION EVERY 12 HOURS SCHEDULED
Status: CANCELLED | OUTPATIENT
Start: 2025-03-31

## 2025-04-03 ENCOUNTER — ANESTHESIA EVENT (OUTPATIENT)
Dept: ENDOSCOPY | Age: 60
End: 2025-04-03
Payer: COMMERCIAL

## 2025-04-04 ENCOUNTER — ANESTHESIA (OUTPATIENT)
Dept: ENDOSCOPY | Age: 60
End: 2025-04-04
Payer: COMMERCIAL

## 2025-04-04 ENCOUNTER — HOSPITAL ENCOUNTER (OUTPATIENT)
Age: 60
Setting detail: OUTPATIENT SURGERY
Discharge: HOME | End: 2025-04-04
Attending: INTERNAL MEDICINE | Admitting: INTERNAL MEDICINE
Payer: COMMERCIAL

## 2025-04-04 VITALS
WEIGHT: 237 LBS | HEART RATE: 76 BPM | SYSTOLIC BLOOD PRESSURE: 119 MMHG | TEMPERATURE: 98.6 F | OXYGEN SATURATION: 97 % | DIASTOLIC BLOOD PRESSURE: 66 MMHG | HEIGHT: 76 IN | BODY MASS INDEX: 28.86 KG/M2 | RESPIRATION RATE: 20 BRPM

## 2025-04-04 DIAGNOSIS — Z12.11 COLON CANCER SCREENING: ICD-10-CM

## 2025-04-04 PROCEDURE — 2500000003 HC RX 250 WO HCPCS: Performed by: INTERNAL MEDICINE

## 2025-04-04 PROCEDURE — 2580000003 HC RX 258

## 2025-04-04 PROCEDURE — 6360000002 HC RX W HCPCS: Performed by: NURSE ANESTHETIST, CERTIFIED REGISTERED

## 2025-04-04 PROCEDURE — 2709999900 HC NON-CHARGEABLE SUPPLY: Performed by: INTERNAL MEDICINE

## 2025-04-04 PROCEDURE — 3700000001 HC ADD 15 MINUTES (ANESTHESIA): Performed by: INTERNAL MEDICINE

## 2025-04-04 PROCEDURE — 88305 TISSUE EXAM BY PATHOLOGIST: CPT

## 2025-04-04 PROCEDURE — 3609027000 HC COLONOSCOPY: Performed by: INTERNAL MEDICINE

## 2025-04-04 PROCEDURE — 45385 COLONOSCOPY W/LESION REMOVAL: CPT | Performed by: INTERNAL MEDICINE

## 2025-04-04 PROCEDURE — 3700000000 HC ANESTHESIA ATTENDED CARE: Performed by: INTERNAL MEDICINE

## 2025-04-04 PROCEDURE — 7100000010 HC PHASE II RECOVERY - FIRST 15 MIN: Performed by: INTERNAL MEDICINE

## 2025-04-04 RX ORDER — NALOXONE HYDROCHLORIDE 0.4 MG/ML
INJECTION, SOLUTION INTRAMUSCULAR; INTRAVENOUS; SUBCUTANEOUS PRN
Status: DISCONTINUED | OUTPATIENT
Start: 2025-04-04 | End: 2025-04-04 | Stop reason: HOSPADM

## 2025-04-04 RX ORDER — SODIUM CHLORIDE 9 MG/ML
INJECTION, SOLUTION INTRAVENOUS PRN
Status: DISCONTINUED | OUTPATIENT
Start: 2025-04-04 | End: 2025-04-04 | Stop reason: HOSPADM

## 2025-04-04 RX ORDER — SODIUM CHLORIDE 0.9 % (FLUSH) 0.9 %
5-40 SYRINGE (ML) INJECTION PRN
Status: DISCONTINUED | OUTPATIENT
Start: 2025-04-04 | End: 2025-04-04 | Stop reason: HOSPADM

## 2025-04-04 RX ORDER — LIDOCAINE HYDROCHLORIDE 10 MG/ML
1 INJECTION, SOLUTION EPIDURAL; INFILTRATION; INTRACAUDAL; PERINEURAL
Status: DISCONTINUED | OUTPATIENT
Start: 2025-04-04 | End: 2025-04-04 | Stop reason: HOSPADM

## 2025-04-04 RX ORDER — SODIUM CHLORIDE 9 MG/ML
INJECTION, SOLUTION INTRAVENOUS CONTINUOUS
Status: DISCONTINUED | OUTPATIENT
Start: 2025-04-04 | End: 2025-04-04 | Stop reason: HOSPADM

## 2025-04-04 RX ORDER — SODIUM CHLORIDE 9 MG/ML
INJECTION, SOLUTION INTRAVENOUS
Status: COMPLETED
Start: 2025-04-04 | End: 2025-04-04

## 2025-04-04 RX ORDER — ONDANSETRON 2 MG/ML
4 INJECTION INTRAMUSCULAR; INTRAVENOUS
Status: DISCONTINUED | OUTPATIENT
Start: 2025-04-04 | End: 2025-04-04 | Stop reason: HOSPADM

## 2025-04-04 RX ORDER — SODIUM CHLORIDE 0.9 % (FLUSH) 0.9 %
5-40 SYRINGE (ML) INJECTION EVERY 12 HOURS SCHEDULED
Status: DISCONTINUED | OUTPATIENT
Start: 2025-04-04 | End: 2025-04-04 | Stop reason: HOSPADM

## 2025-04-04 RX ORDER — LIDOCAINE HYDROCHLORIDE 20 MG/ML
INJECTION, SOLUTION INFILTRATION; PERINEURAL
Status: DISCONTINUED | OUTPATIENT
Start: 2025-04-04 | End: 2025-04-04 | Stop reason: SDUPTHER

## 2025-04-04 RX ORDER — PROPOFOL 10 MG/ML
INJECTION, EMULSION INTRAVENOUS
Status: DISCONTINUED | OUTPATIENT
Start: 2025-04-04 | End: 2025-04-04 | Stop reason: SDUPTHER

## 2025-04-04 RX ADMIN — PROPOFOL 40 MG: 10 INJECTION, EMULSION INTRAVENOUS at 11:00

## 2025-04-04 RX ADMIN — SODIUM CHLORIDE: 9 INJECTION, SOLUTION INTRAVENOUS at 09:56

## 2025-04-04 RX ADMIN — PROPOFOL 100 MG: 10 INJECTION, EMULSION INTRAVENOUS at 10:50

## 2025-04-04 RX ADMIN — PROPOFOL 30 MG: 10 INJECTION, EMULSION INTRAVENOUS at 11:02

## 2025-04-04 RX ADMIN — PROPOFOL 40 MG: 10 INJECTION, EMULSION INTRAVENOUS at 10:56

## 2025-04-04 RX ADMIN — LIDOCAINE HYDROCHLORIDE 50 MG: 20 INJECTION, SOLUTION INFILTRATION; PERINEURAL at 10:50

## 2025-04-04 RX ADMIN — PROPOFOL 30 MG: 10 INJECTION, EMULSION INTRAVENOUS at 11:10

## 2025-04-04 RX ADMIN — PROPOFOL 20 MG: 10 INJECTION, EMULSION INTRAVENOUS at 11:07

## 2025-04-04 RX ADMIN — PROPOFOL 20 MG: 10 INJECTION, EMULSION INTRAVENOUS at 10:57

## 2025-04-04 RX ADMIN — SODIUM CHLORIDE: 0.9 INJECTION, SOLUTION INTRAVENOUS at 09:56

## 2025-04-04 RX ADMIN — PROPOFOL 40 MG: 10 INJECTION, EMULSION INTRAVENOUS at 10:53

## 2025-04-04 RX ADMIN — PROPOFOL 20 MG: 10 INJECTION, EMULSION INTRAVENOUS at 11:03

## 2025-04-04 RX ADMIN — PROPOFOL 40 MG: 10 INJECTION, EMULSION INTRAVENOUS at 10:52

## 2025-04-04 RX ADMIN — PROPOFOL 30 MG: 10 INJECTION, EMULSION INTRAVENOUS at 11:05

## 2025-04-04 ASSESSMENT — PAIN - FUNCTIONAL ASSESSMENT
PAIN_FUNCTIONAL_ASSESSMENT: 0-10
PAIN_FUNCTIONAL_ASSESSMENT: 0-10

## 2025-04-04 NOTE — ANESTHESIA POSTPROCEDURE EVALUATION
Department of Anesthesiology  Postprocedure Note    Patient: Eligio Banegas  MRN: [id number]  YOB: 1965  Date of evaluation: 4/4/2025    Procedure Summary       Date: 04/04/25 Room / Location: [facility] OR 02 / [facility]    Anesthesia Start: 1047 Anesthesia Stop: 1115    Procedure: COLORECTAL CANCER SCREENING WITH POLYPECTOMY Diagnosis:       Colon cancer screening      (Colon cancer screening [Z12.11])    Surgeons: Kane Pillai MD Responsible Provider: Minerva Alonso APRN - CRNA    Anesthesia Type: MAC ASA Status: 2            Anesthesia Type: MAC    Keny Phase I: Keny Score: 10    Keny Phase II: Keny Score: 7    Anesthesia Post Evaluation    Patient location during evaluation: bedside  Patient participation: complete - patient participated  Level of consciousness: awake and sleepy but conscious  Pain score: 0  Airway patency: patent  Nausea & Vomiting: no nausea and no vomiting  Cardiovascular status: blood pressure returned to baseline and hemodynamically stable  Respiratory status: acceptable, nonlabored ventilation, spontaneous ventilation and nasal airway  Hydration status: euvolemic  Pain management: adequate        No notable events documented.

## 2025-04-04 NOTE — ANESTHESIA PRE PROCEDURE
Department of Anesthesiology  Preprocedure Note       Name:  Eligio Banegas   Age:  59 y.o.  :  1965                                          MRN:  [id number]         Date:  2025      Surgeon: Surgeon(s):  Kane Pillai MD    Procedure: Procedure(s):  COLORECTAL CANCER SCREENING, NOT HIGH RISK    Medications prior to admission:   Prior to Admission medications    Medication Sig Start Date End Date Taking? Authorizing Provider   amLODIPine (NORVASC) 5 MG tablet TAKE 1 TABLET BY MOUTH DAILY 24  Yes Ginger Nguyen APRN - CNP   olmesartan-hydroCHLOROthiazide (BENICAR HCT) 40-12.5 MG per tablet TAKE 1 TABLET BY MOUTH DAILY 24  Yes Ginger Nguyen APRN - CNP   atorvastatin (LIPITOR) 20 MG tablet Take 1 tablet by mouth daily 24   Dennis Delatorre MD       Current medications:    Current Facility-Administered Medications   Medication Dose Route Frequency Provider Last Rate Last Admin    0.9 % sodium chloride infusion   IntraVENous Continuous Kane Pillai MD 75 mL/hr at 25 1006 NoRateChange at 25 1006    sodium chloride flush 0.9 % injection 5-40 mL  5-40 mL IntraVENous 2 times per day Kane Pillai MD        sodium chloride flush 0.9 % injection 5-40 mL  5-40 mL IntraVENous PRN Kane Pillai MD        0.9 % sodium chloride infusion   IntraVENous PRN Kane Pillai MD           Allergies:    Allergies   Allergen Reactions    Buspar [Buspirone Hcl] Other (See Comments)     Chills and decreased appetite.    Lopressor [Metoprolol]      Light headed       Problem List:    Patient Active Problem List   Diagnosis Code    Essential hypertension I10    Hypercholesterolemia E78.00    Breast lump on right side at 6 o'clock position N63.15    Celiac disease K90.0    Left inguinal hernia K40.90    Umbilical hernia K42.9    Tachycardia R00.0    Seasonal allergies J30.2    Back abscess L02.212    Primary osteoarthritis involving multiple joints M15.0    Borderline diabetes mellitus R73.03       Past

## 2025-04-04 NOTE — H&P
Patient Name: Eligio Banegas  : 1965  MRN: [id number]  DATE: 25      ENDOSCOPY  History and Physical    Procedure:    [] Diagnostic Colonoscopy       [x] Screening Colonoscopy  [] EGD      [] ERCP      [] EUS       [] Other    [x] Previous office notes/History and Physical reviewed from the patients chart. Please see EMR for further details of HPI. I have examined the patient's status immediately prior to the procedure and:      Indications/HPI:    []Abdominal Pain   []Cancer- GI/Lung  []Fhx of colon CA  []History of Polyps   []Gleason’s   []Melena  []Abnormal Imaging   []Dysphagia    []Persistent Pneumonia  []Anemia   []Food Impaction  []History of Polyps  []GI Bleed   []Pulmonary nodule/Mass  []Change in bowel habits  []Heartburn/Reflux  []Rectal Bleed (BRBPR)  []Chest Pain - Non Cardiac  []Heme (+) Stool  []Ulcers  []Constipation   []Hemoptysis   []Varices  []Diarrhea   []Hypoxemia  []Nausea/Vomiting   []Screening   []Crohns/Colitis  []Other:    Anesthesia:   [x] MAC [] Moderate Sedation   [] General   [] None     ROS: 12 pt Review of Symptoms was negative unless mentioned above    Medications:   Prior to Admission medications    Medication Sig Start Date End Date Taking? Authorizing Provider   amLODIPine (NORVASC) 5 MG tablet TAKE 1 TABLET BY MOUTH DAILY 24  Yes Ginger Nguyen APRN - CNP   olmesartan-hydroCHLOROthiazide (BENICAR HCT) 40-12.5 MG per tablet TAKE 1 TABLET BY MOUTH DAILY 24  Yes Ginger Nguyen APRN - CNP   atorvastatin (LIPITOR) 20 MG tablet Take 1 tablet by mouth daily 24   Dennis Delatorre MD     Allergies:   Allergies   Allergen Reactions    Buspar [Buspirone Hcl] Other (See Comments)     Chills and decreased appetite.    Lopressor [Metoprolol]      Light headed      History of allergic reaction to anesthesia:  No  Past Medical History:  Past Medical History:   Diagnosis Date    Celiac disease     Hypercholesterolemia     Hypertension      Past Surgical

## 2025-04-10 ENCOUNTER — RESULTS FOLLOW-UP (OUTPATIENT)
Dept: GASTROENTEROLOGY | Age: 60
End: 2025-04-10

## 2025-05-10 DIAGNOSIS — I10 ESSENTIAL HYPERTENSION: ICD-10-CM

## 2025-05-10 DIAGNOSIS — E78.00 HYPERCHOLESTEROLEMIA: ICD-10-CM

## 2025-05-11 RX ORDER — AMLODIPINE BESYLATE 5 MG/1
TABLET ORAL
Qty: 90 TABLET | Refills: 1 | Status: SHIPPED | OUTPATIENT
Start: 2025-05-11

## 2025-05-11 NOTE — TELEPHONE ENCOUNTER
Comments:     Last Office Visit (last PCP visit):   3/10/2025    Next Visit Date:  Future Appointments   Date Time Provider Department Center   9/8/2025 10:15 AM Ginger Nguyen APRN - CNP [facility]       **If hasn't been seen in over a year OR hasn't followed up according to last diabetes/ADHD visit, make appointment for patient before sending refill to provider.    Rx requested:  Requested Prescriptions     Pending Prescriptions Disp Refills    atorvastatin (LIPITOR) 20 MG tablet [Pharmacy Med Name: ATORVASTATIN TABS 20MG]  0    olmesartan-hydroCHLOROthiazide (BENICAR HCT) 40-12.5 MG per tablet [Pharmacy Med Name: OLMESARTAN/HCTZ TABS 40/12.5MG]  0

## 2025-05-12 RX ORDER — OLMESARTAN MEDOXOMIL AND HYDROCHLOROTHIAZIDE 40/12.5 40; 12.5 MG/1; MG/1
1 TABLET ORAL DAILY
Qty: 90 TABLET | Refills: 1 | Status: SHIPPED | OUTPATIENT
Start: 2025-05-12

## 2025-05-12 RX ORDER — OLMESARTAN MEDOXOMIL AND HYDROCHLOROTHIAZIDE 40/12.5 40; 12.5 MG/1; MG/1
1 TABLET ORAL DAILY
Qty: 90 TABLET | Refills: 1 | OUTPATIENT
Start: 2025-05-12

## 2025-05-12 RX ORDER — ATORVASTATIN CALCIUM 20 MG/1
20 TABLET, FILM COATED ORAL DAILY
Qty: 90 TABLET | Refills: 1 | Status: SHIPPED | OUTPATIENT
Start: 2025-05-12

## 2025-05-12 NOTE — TELEPHONE ENCOUNTER
Comments:     Last Office Visit (last PCP visit):   3/10/2025    Next Visit Date:  Future Appointments   Date Time Provider Department Center   9/8/2025 10:15 AM Ginger Nguyen APRN - CNP [facility]       **If hasn't been seen in over a year OR hasn't followed up according to last diabetes/ADHD visit, make appointment for patient before sending refill to provider.    Rx requested:  Requested Prescriptions     Pending Prescriptions Disp Refills    olmesartan-hydroCHLOROthiazide (BENICAR HCT) 40-12.5 MG per tablet 90 tablet 1     Sig: Take 1 tablet by mouth daily

## 2025-05-23 RX ORDER — ATORVASTATIN CALCIUM 20 MG/1
20 TABLET, FILM COATED ORAL DAILY
Qty: 90 TABLET | Refills: 1 | OUTPATIENT
Start: 2025-05-23

## (undated) DEVICE — TUBE SET 96 MM 64 MM H2O PERISTALTIC STD AUX CHANNEL

## (undated) DEVICE — BRUSH ENDO CLN L90.5IN SHTH DIA1.7MM BRIST DIA5-7MM 2-6MM

## (undated) DEVICE — TUBING, SUCTION, 1/4" X 10', STRAIGHT: Brand: MEDLINE

## (undated) DEVICE — SINGLE PORT MANIFOLD: Brand: NEPTUNE 2

## (undated) DEVICE — SNARE ENDOSCP POLYP 2.4 MM 240 CM 10 MM 2.8 MM CAPTIVATOR

## (undated) DEVICE — TUBING IRRIGATION 140/160/180/190 SER GI ENDOSCP SMARTCAP

## (undated) DEVICE — TRAP POLYP BALEEN